# Patient Record
Sex: FEMALE | Race: OTHER | Employment: UNEMPLOYED | URBAN - METROPOLITAN AREA
[De-identification: names, ages, dates, MRNs, and addresses within clinical notes are randomized per-mention and may not be internally consistent; named-entity substitution may affect disease eponyms.]

---
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
## 2022-07-07 ENCOUNTER — HOSPITAL ENCOUNTER (EMERGENCY)
Age: 22
Discharge: HOME | End: 2022-07-07
Attending: EMERGENCY MEDICINE
Payer: COMMERCIAL

## 2022-07-07 VITALS
HEIGHT: 60 IN | OXYGEN SATURATION: 99 % | HEART RATE: 89 BPM | WEIGHT: 113.32 LBS | DIASTOLIC BLOOD PRESSURE: 80 MMHG | SYSTOLIC BLOOD PRESSURE: 124 MMHG | BODY MASS INDEX: 22.25 KG/M2 | TEMPERATURE: 98.4 F | RESPIRATION RATE: 16 BRPM

## 2022-07-07 DIAGNOSIS — R10.30 LOWER ABDOMINAL PAIN: ICD-10-CM

## 2022-07-07 DIAGNOSIS — K59.04 CHRONIC IDIOPATHIC CONSTIPATION: Primary | ICD-10-CM

## 2022-07-07 LAB
BILIRUBIN URINE: NEGATIVE
BLOOD, URINE: NEGATIVE
CLARITY: CLEAR
COLOR: YELLOW
GLUCOSE URINE: NEGATIVE MG/DL
HCG(URINE) PREGNANCY TEST: NEGATIVE
KETONES, URINE: ABNORMAL MG/DL
LEUKOCYTE ESTERASE, URINE: NEGATIVE
MICROSCOPIC EXAMINATION: ABNORMAL
NITRITE, URINE: NEGATIVE
PH UA: 6 (ref 5–8)
PROTEIN UA: NEGATIVE MG/DL
SPECIFIC GRAVITY UA: >=1.03 (ref 1–1.03)
URINE REFLEX TO CULTURE: ABNORMAL
URINE TYPE: ABNORMAL
UROBILINOGEN, URINE: 1 E.U./DL

## 2022-07-07 PROCEDURE — 99283 EMERGENCY DEPT VISIT LOW MDM: CPT

## 2022-07-07 PROCEDURE — 81003 URINALYSIS AUTO W/O SCOPE: CPT

## 2022-07-07 PROCEDURE — 84703 CHORIONIC GONADOTROPIN ASSAY: CPT

## 2022-07-07 RX ORDER — DOCUSATE SODIUM 100 MG/1
100 CAPSULE, LIQUID FILLED ORAL 2 TIMES DAILY
Qty: 60 CAPSULE | Refills: 0 | Status: SHIPPED | OUTPATIENT
Start: 2022-07-07 | End: 2022-08-02

## 2022-07-07 RX ORDER — SENNA AND DOCUSATE SODIUM 50; 8.6 MG/1; MG/1
1 TABLET, FILM COATED ORAL DAILY
Qty: 10 TABLET | Refills: 0 | Status: SHIPPED | OUTPATIENT
Start: 2022-07-07 | End: 2022-08-02

## 2022-07-07 ASSESSMENT — ENCOUNTER SYMPTOMS
SORE THROAT: 0
SHORTNESS OF BREATH: 0
EYES NEGATIVE: 1
NAUSEA: 0
RESPIRATORY NEGATIVE: 1
ABDOMINAL PAIN: 1
DIARRHEA: 0
CONSTIPATION: 1
ABDOMINAL DISTENTION: 0
ALLERGIC/IMMUNOLOGIC NEGATIVE: 1
VOMITING: 0

## 2022-07-07 ASSESSMENT — PAIN - FUNCTIONAL ASSESSMENT: PAIN_FUNCTIONAL_ASSESSMENT: NONE - DENIES PAIN

## 2022-07-07 NOTE — ED PROVIDER NOTES
[address] ENCOUNTER      Pt Name: Taina Che  MRN: [id number]  [place] 2000  Date ofevaluation: 7/7/2022  Provider: Baron La MD    CHIEF COMPLAINT       Chief Complaint   Patient presents with    Abdominal Pain     Belives she's constipated. Last BM 2 days ago and describes it as normal. pain is located allover abdomen when she tries to bare down and 30 minutes after she goes. She's been taking some OTC x 3 days medicine but hasn't helped anymore. Denies any n/v         HISTORY OF PRESENT ILLNESS   (Location/Symptom, Timing/Onset,Context/Setting, Quality, Duration, Modifying Factors, Severity)  Note limiting factors. Taina Che is a 23 y.o. female  who  has no past medical history on file. who presents to the emergency department    53-year-old female presents for abdominal pain and constipation. Has not had a bowel movement for greater than 2 days. Describes it as kind of a aching bloating feeling in her lower abdomen worse when she is trying to bear down and 30 minutes after she attempts to have a bowel movement. Has had 1 very small hard bowel movement. Has been taking over-the-counter MiraLAX for the last 3 days but has not really helped. Does frequently get constipated this is not out of the ordinary for her. No nausea or vomiting. No other associated symptoms. No other modifying factors besides those listed mild to moderate in nature. Gradual in onset constant and unchanging. No other known risk factors. The history is provided by the patient. No  was used. NursingNotes were reviewed. REVIEW OF SYSTEMS    (2-9 systems for level 4, 10 or more for level 5)     Review of Systems   Constitutional: Negative. Negative for chills and fever. HENT: Negative. Negative for congestion and sore throat. Eyes: Negative. Respiratory: Negative. Negative for shortness of breath. Cardiovascular: Negative. Negative for chest pain and palpitations. Gastrointestinal: Positive for abdominal pain and constipation. Negative for abdominal distention, diarrhea, nausea and vomiting. Genitourinary: Negative. Negative for decreased urine volume, dysuria, vaginal bleeding and vaginal discharge. Musculoskeletal: Negative. Skin: Negative. Allergic/Immunologic: Negative. Neurological: Negative. Negative for light-headedness and headaches. Hematological: Negative. Does not bruise/bleed easily. All other systems reviewed and are negative. Except as noted above the remainder of the review of systems was reviewed and negative. PAST MEDICAL HISTORY   No past medical history on file. SURGICALHISTORY       Past Surgical History:   Procedure Laterality Date    APPENDECTOMY           CURRENT MEDICATIONS       Previous Medications    HYDROCORTISONE 1 % CREAM    Apply topically 2 times daily while symptoms last    PRENATAL VIT-FE FUMARATE-FA (PRENATAL VITAMIN) 28-0.8 MG TABS TABLET    Prenatal 28 mg-800 mcg tablet   Take 1 tablet every day by oral route. Patient has no known allergies. FAMILY HISTORY     No family history on file.        SOCIAL HISTORY       Social History     Socioeconomic History    Marital status: Single     Spouse name: Not on file    Number of children: Not on file    Years of education: Not on file    Highest education level: Not on file   Occupational History    Not on file   Tobacco Use    Smoking status: Never Smoker    Smokeless tobacco: Never Used   Substance and Sexual Activity    Alcohol use: No    Drug use: No    Sexual activity: Never   Other Topics Concern    Not on file   Social History Narrative    Not on file     Social Determinants of Health     Financial Resource Strain:     Difficulty of Paying Living Expenses: Not on file   Food Insecurity:     Worried About Running Out of Food in the Last Year: Not on file  Ran Out of Food in the Last Year: Not on file   Transportation Needs:     Lack of Transportation (Medical): Not on file    Lack of Transportation (Non-Medical): Not on file   Physical Activity:     Days of Exercise per Week: Not on file    Minutes of Exercise per Session: Not on file   Stress:     Feeling of Stress : Not on file   Social Connections:     Frequency of Communication with Friends and Family: Not on file    Frequency of Social Gatherings with Friends and Family: Not on file    Attends [employer] Services: Not on file    Active Member of [address] [place] [address] numberFire or Organizations: Not on file    Attends Club or Organization Meetings: Not on file    Marital Status: Not on file   Intimate Partner Violence:     Fear of Current or Ex-Partner: Not on file    Emotionally Abused: Not on file    Physically Abused: Not on file    Sexually Abused: Not on file   Housing Stability:     Unable to Pay for Housing in the Last Year: Not on file    Number of [place] in the Last Year: Not on file    Unstable Housing in the Last Year: Not on file       SCREENINGS    [place] Coma Scale  Eye Opening: Spontaneous  Best Verbal Response: Oriented  Best Motor Response: Obeys commands  [place] Coma Scale Score: 15        PHYSICAL EXAM    (up to 7 for level 4, 8 or more for level 5)     ED Triage Vitals   BP Temp Temp src Pulse Resp SpO2 Height Weight   -- -- -- -- -- -- -- --       Physical Exam  Vitals and nursing note reviewed. Constitutional:       General: She is not in acute distress. Appearance: She is not toxic-appearing or diaphoretic. HENT:      Head: Normocephalic and atraumatic. Right Ear: External ear normal.      Left Ear: External ear normal.      Nose: Nose normal.      Mouth/Throat:      Mouth: Mucous membranes are moist.   Eyes:      Extraocular Movements: Extraocular movements intact. Pupils: Pupils are equal, round, and reactive to light.    Cardiovascular:      Rate and Rhythm: Normal rate and regular rhythm. Heart sounds: Normal heart sounds. Pulmonary:      Effort: Pulmonary effort is normal. No respiratory distress. Breath sounds: Normal breath sounds. No stridor. No wheezing, rhonchi or rales. Chest:      Chest wall: No tenderness. Abdominal:      General: Abdomen is flat. Bowel sounds are normal. There is no distension. Palpations: Abdomen is soft. There is no shifting dullness or mass. Tenderness: There is no abdominal tenderness. There is no right CVA tenderness, left CVA tenderness, guarding or rebound. Negative signs include Garza's sign and McBurney's sign. Hernia: No hernia is present. Musculoskeletal:      Cervical back: Neck supple. Right lower leg: No edema. Left lower leg: No edema. Skin:     General: Skin is warm and dry. Capillary Refill: Capillary refill takes less than 2 seconds. Neurological:      General: No focal deficit present. Mental Status: She is alert and oriented to person, place, and time. Psychiatric:         Mood and Affect: Mood normal.         Behavior: Behavior normal.         RESULTS         RADIOLOGY:   Non-plain filmimages such as CT, Ultrasound and MRI are read by the radiologist.   Interpretation per the Radiologist below, if available at the time ofthis note:    No orders to display         ED BEDSIDE ULTRASOUND:   Performed by ED Physician - none    LABS:  Labs Reviewed   URINALYSIS WITH REFLEX TO CULTURE - Abnormal; Notable for the following components:       Result Value    Ketones, Urine TRACE (*)     All other components within normal limits   PREGNANCY, URINE       All other labs were within normal range or not returned as of this dictation.     EMERGENCY DEPARTMENT COURSE and DIFFERENTIAL DIAGNOSIS/MDM:   Vitals:    Vitals:    07/07/22 1930   BP: 124/80   Pulse: 89   Resp: 16   Temp: 98.4 °F (36.9 °C)   TempSrc: Oral   SpO2: 99%   Weight: 113 lb 5.1 oz (51.4 kg)   Height: 5' (1.524 m) Patient was given thefollowing medications:  Medications - No data to display    ED [facility]    Pertinent Labs & Imaging studies reviewed. (See chart for details)   -  Patient seen and evaluated in the emergency department. -  Triage and nursing notes reviewed and incorporated. -  Old chart records reviewed and incorporated. -  Differential diagnosis includes: Differential diagnosis: Abdominal Aortic Aneurysm, Ischemic Bowel, Bowel Obstruction, Appendicitis, Diverticulitis, Pyelonephritis, UTI, STD, Ureterolithiasis, Colitis, Gonad Torsion, other      72-year-old female presents with constipation and mild lower abdominal pain. Abdomen is nontender not reproducible. Not peritoneal in nature. No urinary or vaginal symptoms. Patient is a history of prior appendectomy. Afebrile not tachycardic saturating well on room air otherwise shows no signs systemic illness. Urine does not appear infected. No signs of UTI pregnancy is negative. Will give patient symptomatic treatment with Docusate senna as well as Dulcolax and encourage patient to continue to take MiraLAX for stool softening and increase water intake. Strict return precautions given for any new or worsening symptoms.      -  Work-up included:  See above  -  ED treatment included: See above  -  Results discussed with patient. The patient is agreeable with plan of care and disposition. Exclusion criteria - the patient is NOT to be included for SEP-1 Core Measure due to:  2+ SIRS criteria are not met      REASSESSMENT       I estimate there is LOW risk for ACUTE APPENDICITIS, BOWEL OBSTRUCTION, CHOLECYSTITIS, DIVERTICULITIS, INCARCERATED HERNIA, PANCREATITIS, PERITONITIS, PELVIC INFLAMMATORY DISEASE, OVARIAN TORSION, PERFORATED BOWEL, BOWEL ISCHEMIA, CARDIAC ISCHEMIA, ECTOPIC PREGNANCY, TUBO-OVARIAN ABSCESS, or SEPSIS, thus I consider the discharge disposition reasonable.     The patient is at low risk for mortality based on demographic, history and clinical factors. Given the best available information and clinical assessment, I estimate the risk of hospitalization to be greater than risk of treatment at home. I have explained to the patient that the risk could rapidly change, given precautions for return and instructions. Explained to patient that the risk for mortality is low based on demographic, history and clinical factors. I discussed with patient the results of evaluation in the ED, diagnosis, care, and prognosis. The plan is to discharge to home. Patient is in agreement with plan and questions have been answered. I also discussed with patient the reasons which may require a return visit and the importance of follow-up care. The patient is well-appearing, nontoxic, and improved at the time of discharge. Patient agrees to call to arrange follow-up care as directed. Patient understands to return immediately for worsening/change in symptoms. CRITICAL CARE TIME   Total Critical Care time was 12 minutes, excluding separately reportable procedures. There was a high probability of clinically significant/life threatening deterioration in the patient's condition which required my urgent intervention. This includes multiple reevaluations, vital sign monitoring, pulse oximetry monitoring, telemetry monitoring, clinical response to the IV medications, reviewing the nursing notes, consultation time, dictation/documentation time, and interpretation of the labwork. (This time excludes time spent performing procedures). CONSULTS:  None    PROCEDURES:  Unless otherwise noted below, none     Procedures    FINAL IMPRESSION      1. Chronic idiopathic constipation    2.  Lower abdominal pain          DISPOSITION/PLAN   DISPOSITION        PATIENT REFERREDTO:  BELINDA BRAVO-Sean [facility] [address]  [address] [facility]  [phone]    Schedule an appointment as soon as possible for a visit         DISCHARGEMEDICATIONS:  New Prescriptions    DOCUSATE SODIUM (COLACE) 100 MG CAPSULE    Take 1 capsule by mouth 2 times daily    SENNOSIDES-DOCUSATE SODIUM (SENOKOT-S) 8.6-50 MG TABLET    Take 1 tablet by mouth daily          (Please note that portions of this note were completed with a voice recognition program.  Efforts were made to edit the dictations but occasionally words are mis-transcribed.)    Deborah Cota MD (electronically signed)  Attending Emergency Physician         Deborah Cota MD  07/07/22 2008

## 2022-07-07 NOTE — DISCHARGE INSTR - COC
Continuity of Care Form    Patient Name: Christiano Crowe   :  2000  MRN:  [id number]    Admit date:  (Not on file)  Discharge date:  ***    Code Status Order: No Order   Advance Directives:      Admitting Physician:  No admitting provider for patient encounter. PCP: [facility]    Discharging Nurse: [facility] Unit/Room#: No information available for this encounter. Discharging Unit Phone Number: ***    Emergency Contact:   Extended Emergency Contact Information  Primary Emergency Contact: TaYaritza  Address: [address] [place] [address] Phone: [phone]  Relation: Parent  Secondary Emergency Contact: KeyonPablito  Address: [address] Phone: [phone]  Relation: Parent    Past Surgical History:  Past Surgical History:   Procedure Laterality Date    APPENDECTOMY         Immunization History: There is no immunization history on file for this patient. Active Problems: There is no problem list on file for this patient. Isolation/Infection:   Isolation            No Isolation          Patient Infection Status       None to display            Nurse Assessment:  Last Vital Signs: There were no vitals taken for this visit. Last documented pain score (0-10 scale):    Last Weight:   Wt Readings from Last 1 Encounters:   21 109 lb 2 oz (49.5 kg) (15 %, Z= -1.03)*     * Growth percentiles are based on [facility] (Girls, 2-20 Years) data.      Mental Status:  {IP PT MENTAL STATUS:22794}    IV Access:  { TANJA IV ACCESS:958562287}    Nursing Mobility/ADLs:  Walking   {CHP DME JBVZ:662380110}  Transfer  {CHP DME VATU:442416493}  Bathing  {CHP DME MWVL:753906403}  Dressing  {CHP DME VLNO:307944772}  Toileting  {CHP DME LQZ}  Feeding  {CHP DME QZLY:593780640}  Med Admin  {CHP DME IAQR:780682103}  Med Delivery   { TANJA MED transferring to Rehab): {Prognosis:8163614103}    Recommended Labs or Other Treatments After Discharge: ***    Physician Certification: I certify the above information and transfer of Orvil Signs  is necessary for the continuing treatment of the diagnosis listed and that she requires {Admit to Appropriate Level of Care:76426} for {GREATER/LESS:227196235} 30 days.      Update Admission H&P: {CHP DME Changes in VRUOP:232534443}    PHYSICIAN SIGNATURE:  {Esignature:890925055}

## 2022-08-02 ENCOUNTER — HOSPITAL ENCOUNTER (EMERGENCY)
Age: 22
Discharge: HOME | End: 2022-08-02
Payer: COMMERCIAL

## 2022-08-02 VITALS
TEMPERATURE: 102.3 F | RESPIRATION RATE: 15 BRPM | HEART RATE: 115 BPM | WEIGHT: 110.89 LBS | SYSTOLIC BLOOD PRESSURE: 119 MMHG | OXYGEN SATURATION: 99 % | BODY MASS INDEX: 17.82 KG/M2 | DIASTOLIC BLOOD PRESSURE: 74 MMHG | HEIGHT: 66 IN

## 2022-08-02 DIAGNOSIS — H66.002 ACUTE SUPPURATIVE OTITIS MEDIA OF LEFT EAR WITHOUT SPONTANEOUS RUPTURE OF TYMPANIC MEMBRANE, RECURRENCE NOT SPECIFIED: ICD-10-CM

## 2022-08-02 DIAGNOSIS — U07.1 COVID-19 VIRUS INFECTION: Primary | ICD-10-CM

## 2022-08-02 LAB
S PYO AG THROAT QL: NEGATIVE
SARS-COV-2, NAAT: DETECTED

## 2022-08-02 PROCEDURE — 87081 CULTURE SCREEN ONLY: CPT

## 2022-08-02 PROCEDURE — 6370000000 HC RX 637 (ALT 250 FOR IP): Performed by: PHYSICIAN ASSISTANT

## 2022-08-02 PROCEDURE — 87635 SARS-COV-2 COVID-19 AMP PRB: CPT

## 2022-08-02 PROCEDURE — 99283 EMERGENCY DEPT VISIT LOW MDM: CPT

## 2022-08-02 PROCEDURE — 87880 STREP A ASSAY W/OPTIC: CPT

## 2022-08-02 PROCEDURE — 87077 CULTURE AEROBIC IDENTIFY: CPT

## 2022-08-02 RX ORDER — AMOXICILLIN 500 MG/1
500 CAPSULE ORAL 3 TIMES DAILY
Qty: 30 CAPSULE | Refills: 0 | Status: SHIPPED | OUTPATIENT
Start: 2022-08-02 | End: 2022-08-12

## 2022-08-02 RX ORDER — MEDROXYPROGESTERONE ACETATE 150 MG/ML
150 INJECTION, SUSPENSION INTRAMUSCULAR
COMMUNITY
Start: 2021-12-14 | End: 2022-12-09

## 2022-08-02 RX ORDER — ACETAMINOPHEN 325 MG/1
650 TABLET ORAL ONCE
Status: COMPLETED | OUTPATIENT
Start: 2022-08-02 | End: 2022-08-02

## 2022-08-02 RX ORDER — IBUPROFEN 600 MG/1
600 TABLET ORAL ONCE
Status: COMPLETED | OUTPATIENT
Start: 2022-08-02 | End: 2022-08-02

## 2022-08-02 RX ADMIN — IBUPROFEN 600 MG: 600 TABLET ORAL at 19:36

## 2022-08-02 RX ADMIN — ACETAMINOPHEN 650 MG: 325 TABLET ORAL at 19:36

## 2022-08-02 ASSESSMENT — PAIN DESCRIPTION - LOCATION: LOCATION: HEAD

## 2022-08-02 ASSESSMENT — PAIN SCALES - GENERAL
PAINLEVEL_OUTOF10: 5
PAINLEVEL_OUTOF10: 7

## 2022-08-02 ASSESSMENT — PAIN DESCRIPTION - DESCRIPTORS: DESCRIPTORS: ACHING

## 2022-08-02 NOTE — ED NOTES
Handoff report to Silvia Garces RN to assume care. Denies further questions.       Larry Carbajal RN  08/02/22 3287

## 2022-08-02 NOTE — DISCHARGE INSTRUCTIONS
-You need to quarantine for days 1-5 from symptom onset. You can discontinue quarantine after day 5 as long as you have been fever free for  the previous 24 hours without the use of fever reducing medications.   You then need to wear a mask when around others for days 6-10

## 2022-08-03 NOTE — ED PROVIDER NOTES
[facility] ENCOUNTER        Pt Name: Daniele Cook  MRN: [id number]  [place] 2000  Date of evaluation: 8/2/2022  Provider: LINDA Guadarrama      ADVANCED PRACTICE PROVIDER, I HAVE EVALUATED THIS PATIENT    CHIEF COMPLAINT     Sore throat      HISTORY OF PRESENT ILLNESS  (Location/Symptom, Timing/Onset, Context/Setting, Quality, Duration,Modifying Factors, Severity.)   Daniele Cook is a 23 y.o. female who presents to the emergencydepartment for sore throat that started yesterday. Today she has developed headache, chills, myalgias. Denies fever. Took Tylenol around 10 AM.  Nothing since. She denies cough, congestion, rhinorrhea. Does report nausea and 3 episodes of vomiting today. Has tolerated water though. Denies diarrhea. Denies abdominal pain. Denies sick contacts. No prior episodes. No health problems. Nursing Notes were reviewed and I agree. REVIEW OF SYSTEMS    (2-9 systems for level 4, 10 or more for level 5)   Review of Systems     Pertinent positives and negatives as per HPI. All other systems reviewed and are negative except as noted    PAST MEDICAL HISTORY   History reviewed. No pertinent past medical history. SURGICAL HISTORY         Procedure Laterality Date    APPENDECTOMY         CURRENT MEDICATIONS       Previous Medications    MEDROXYPROGESTERONE (DEPO-PROVERA) 150 MG/ML INJECTION    Inject 150 mg into the muscle       ALLERGIES     Patient has no known allergies. FAMILY HISTORY     History reviewed. No pertinent family history. No family status information on file. SOCIAL HISTORY      reports that she has never smoked. She has never used smokeless tobacco. She reports that she does not drink alcohol and does not use drugs.     PHYSICAL EXAM    (up to 7 for level 4, 8 or more for level 5)     ED Triage Vitals [08/02/22 1834]   BP Temp Temp Source Heart Rate Resp SpO2 Height Weight   119/74 98.8 °F (37.1 °C) Oral (!) 113 16 99 % 5' 6\" (1.676 m) 110 lb 14.3 oz (50.3 kg)       Physical Exam  Constitutional:       General: She is not in acute distress. Appearance: Normal appearance. She is well-developed. She is not ill-appearing, toxic-appearing or diaphoretic. HENT:      Head: Normocephalic and atraumatic. Right Ear: Tympanic membrane, ear canal and external ear normal.      Ears:      Comments: Left TM erythematous with purulent exudate. No perforation. Canal normal.     Mouth/Throat:      Mouth: Mucous membranes are moist.      Pharynx: Oropharynx is clear. No oropharyngeal exudate or posterior oropharyngeal erythema. Comments: No trismus. No difficulty swallowing or handling secretions. No signs of airway compromise  Cardiovascular:      Rate and Rhythm: Regular rhythm. Tachycardia present. Heart sounds: Normal heart sounds. Pulmonary:      Effort: Pulmonary effort is normal. No respiratory distress. Breath sounds: Normal breath sounds. Musculoskeletal:         General: Normal range of motion. Cervical back: Normal range of motion and neck supple. Skin:     General: Skin is warm. Neurological:      Mental Status: She is alert. Psychiatric:         Mood and Affect: Mood normal.         Behavior: Behavior normal.         Thought Content:  Thought content normal.         Judgment: Judgment normal.       DIFFERENTIAL DIAGNOSIS   Strep throat, COVID, URI, otitis media, other    DIAGNOSTICRESULTS         RADIOLOGY:   Non-plain film images such as CT, Ultrasound and MRI are read by the radiologist. Plain radiographic images are visualized and preliminarily interpreted by LINDA Tripathi with the below findings:      Interpretation per the Radiologist below, if available at the time of this note:    No orders to display         LABS:  Labs Reviewed   COVID-19, RAPID - Abnormal; Notable for the following components:       Result Value    SARS-CoV-2, NAAT REFERRED TO:  BELINDA Peoples [address]  [address] [facility]  [phone]    Schedule an appointment as soon as possible for a visit   for reevaluation    [place] [address]  [phone]    As needed, If symptoms worsen      DISCHARGE MEDICATIONS:  New Prescriptions    AMOXICILLIN (AMOXIL) 500 MG CAPSULE    Take 1 capsule by mouth in the morning and 1 capsule at noon and 1 capsule before bedtime. Do all this for 10 days.        (Please note that portions ofthis note were completed with a voice recognition program.  Efforts were made to edit the dictations but occasionally words are mis-transcribed.)    Katt Chowdhury, [address] [place] [address]  08/02/22 3409

## 2022-08-03 NOTE — ED NOTES
Discharge and education instructions reviewed. Patient verbalized understanding, teach-back successful. Patient denied questions at this time. No acute distress noted. Patient instructed to follow-up as noted - return to emergency department if symptoms worsen. Patient verbalized understanding. Discharged per EDMD with discharge instructions.           Isabel Gutierrez, [place]  08/02/22 0368

## 2022-08-05 LAB
ORGANISM: ABNORMAL
S PYO THROAT QL CULT: ABNORMAL
S PYO THROAT QL CULT: ABNORMAL

## 2022-12-22 ENCOUNTER — HOSPITAL ENCOUNTER (EMERGENCY)
Age: 22
Discharge: HOME | End: 2022-12-22
Attending: EMERGENCY MEDICINE
Payer: COMMERCIAL

## 2022-12-22 VITALS
BODY MASS INDEX: 20.19 KG/M2 | RESPIRATION RATE: 18 BRPM | OXYGEN SATURATION: 98 % | TEMPERATURE: 98.5 F | SYSTOLIC BLOOD PRESSURE: 101 MMHG | WEIGHT: 106.92 LBS | DIASTOLIC BLOOD PRESSURE: 63 MMHG | HEART RATE: 76 BPM | HEIGHT: 61 IN

## 2022-12-22 DIAGNOSIS — H92.01 RIGHT EAR PAIN: Primary | ICD-10-CM

## 2022-12-22 PROCEDURE — 99283 EMERGENCY DEPT VISIT LOW MDM: CPT

## 2022-12-22 RX ORDER — CETIRIZINE HYDROCHLORIDE 10 MG/1
10 TABLET ORAL DAILY
Qty: 30 TABLET | Refills: 0 | Status: SHIPPED | OUTPATIENT
Start: 2022-12-22

## 2022-12-22 ASSESSMENT — PAIN DESCRIPTION - LOCATION: LOCATION: EAR

## 2022-12-22 ASSESSMENT — PAIN - FUNCTIONAL ASSESSMENT: PAIN_FUNCTIONAL_ASSESSMENT: 0-10

## 2022-12-22 ASSESSMENT — PAIN DESCRIPTION - DESCRIPTORS: DESCRIPTORS: ACHING

## 2022-12-22 ASSESSMENT — PAIN DESCRIPTION - ORIENTATION: ORIENTATION: RIGHT

## 2022-12-22 ASSESSMENT — PAIN SCALES - GENERAL: PAINLEVEL_OUTOF10: 3

## 2022-12-22 NOTE — ED TRIAGE NOTES
Patient presents to ED complaining of right ear pain x2 days. Denies other symptoms, denies changes in hearing. Patient resting on bed, respirations even and easy at this time. No obvious distress.

## 2022-12-22 NOTE — ED NOTES
Patient ambulatory from ED. AVS provided and discussed with patient. All questions answered. Patient verbalizes understanding of discharge instructions. Respirations even and easy. No obvious distress at this time.        Jeet Isidro RN  12/22/22 7467

## 2022-12-22 NOTE — ED PROVIDER NOTES
[facility] ENCOUNTER      Pt Name: Brian Balbuena  MRN: [id number]  [place] 2000  Date of evaluation: 12/22/2022  Provider: Carly Morgan, [address] [place] [address]       Chief Complaint   Patient presents with    Otalgia     Patient presents to ED complaining of right ear pain x2 days. Denies other symptoms, denies changes in hearing. HISTORY OF PRESENT ILLNESS   (Location/Symptom, Timing/Onset, Context/Setting, Quality, Duration, Modifying Factors, Severity)  Note limiting factors. Brian Balbuena is a 23 y.o. female who presents to the emergency department with a complaint of right ear pain for the past 2 days. The patient states that she has had some mild discomfort in the right ear but not severe pain as well as an itching sensation. She denies any change in her hearing, hearing loss, tinnitus or vertigo. She denies any sinus drainage, fever, chills, cough or cold symptoms. She denies any sore throat. She denies any chest pain or shortness of breath. She denies any trauma or injury. She denies any problems with the left ear. She denies any dental pain. Denies any drainage from the ear. Nursing Notes were reviewed. HPI        REVIEW OF SYSTEMS    (2-9 systems for level 4, 10 or more for level 5)       Constitutional: Negative for fever or chills. HENT: Negative for rhinorrhea and sore throat. Eyes: Negative for redness or drainage. Respiratory: Negative for shortness of breath or dyspnea on exertion. Cardiovascular: Negative for chest pain. Gastrointestinal: Negative for abdominal pain. Negative for vomiting or diarrhea. Genitourinary: Negative for flank pain. Negative for dysuria. Negative for hematuria. Neurological: Negative for headache. Musculoskeletal:  Negative edema. Hematological: Negative for adenopathy.        All systems are reviewed and are negative except for those listed above in the history of present illness and ROS. PAST MEDICAL HISTORY   History reviewed. No pertinent past medical history. SURGICAL HISTORY       Past Surgical History:   Procedure Laterality Date    APPENDECTOMY           CURRENT MEDICATIONS       Previous Medications    MEDROXYPROGESTERONE (DEPO-PROVERA) 150 MG/ML INJECTION    Inject 150 mg into the muscle       ALLERGIES     Patient has no known allergies. FAMILY HISTORY     History reviewed. No pertinent family history. SOCIAL HISTORY       Social History     Socioeconomic History    Marital status: Single     Spouse name: None    Number of children: None    Years of education: None    Highest education level: None   Tobacco Use    Smoking status: Never    Smokeless tobacco: Never   Vaping Use    Vaping Use: Never used   Substance and Sexual Activity    Alcohol use: No    Drug use: No    Sexual activity: Never       SCREENINGS    Young Coma Scale  Eye Opening: Spontaneous  Best Verbal Response: Oriented  Best Motor Response: Obeys commands  [place]o Coma Scale Score: 15        PHYSICAL EXAM    (up to 7 for level 4, 8 or more for level 5)     ED Triage Vitals [12/22/22 1021]   BP Temp Temp Source Heart Rate Resp SpO2 Height Weight   116/67 98.5 °F (36.9 °C) Oral 84 20 100 % 5' 1\" (1.549 m) 106 lb 14.8 oz (48.5 kg)         Physical Exam   Constitutional: Awake and alert. Very pleasant. Well-appearing. Appears comfortable. Head: No visible evidence of trauma. Normocephalic. Eyes: Pupils equal and reactive. No photophobia. Conjunctiva normal.    HENT: Oral mucosa moist.  Airway patent. Pharynx without erythema. Nares were clear. Sinuses nontender to percussion and palpation. No mastoid tenderness. External ears were normal.  Canals were clear and patent and normal.  Tympanic membranes were intact bilaterally without erythema. There was air-fluid level noted in the right tympanic membrane. Pharynx without erythema. Uvula midline.   No stridor or drooling. Dentition was normal in appearance. Neck:  Soft and supple. Nontender. Heart:  Regular rate and rhythm. Lungs:  No conversational dyspnea or accessory muscle use. Musculoskeletal: Extremities non-tender with full range of motion. Neurological: Alert and oriented x 3. Speech clear. Cranial nerves II-XII intact. No facial droop. No acute focal motor or sensory deficits. Skin: Skin is warm and dry. No rash. Lymphatic:  No lympadenopathy. Psychiatric: Normal mood and affect. Behavior is normal.         DIAGNOSTIC RESULTS     EKG: All EKG's are interpreted by the Emergency Department Physician who either signs or Co-signs this chart in the absence of a cardiologist.        RADIOLOGY:   Non-plain film images such as CT, Ultrasound and MRI are read by the radiologist. Plain radiographic images are visualized and preliminarily interpreted by the emergency physician with the below findings:        Interpretation per the Radiologist below, if available at the time of this note:    No orders to display         ED BEDSIDE ULTRASOUND:   Performed by ED Physician - none    LABS:  Labs Reviewed - No data to display    All other labs were within normal range or not returned as of this dictation. EMERGENCY DEPARTMENT COURSE and DIFFERENTIAL DIAGNOSIS/MDM:   Vitals:    Vitals:    12/22/22 1021   BP: 116/67   Pulse: 84   Resp: 20   Temp: 98.5 °F (36.9 °C)   TempSrc: Oral   SpO2: 100%   Weight: 106 lb 14.8 oz (48.5 kg)   Height: 5' 1\" (1.549 m)         MDM        The patient presents with mild discomfort in the right ear and itching sensation. She does have an air-fluid level in the right tympanic membrane but there is no evidence of any erythema or induration. Tympanic membrane is normal in appearance otherwise. She will be given a prescription for Zyrtec. Currently no evidence of otitis media.   I advised her to drink plenty of fluids and follow-up with a primary care physician 1 to 2 days for reexamination. If her condition worsens or new symptoms develop, she was advised to return immediately to the emergency department. Is this patient to be included in the SEP-1 Core Measure due to severe sepsis or septic shock? No   Exclusion criteria - the patient is NOT to be included for SEP-1 Core Measure due to: Infection is not suspected      REASSESSMENT              CRITICAL CARE TIME   Total Critical Care time was 0 minutes, excluding separately reportable procedures. There was a high probability of clinically significant/life threatening deterioration in the patient's condition which required my urgent intervention. CONSULTS:  None    PROCEDURES:  Unless otherwise noted below, none     Procedures        FINAL IMPRESSION      1. Right ear pain          DISPOSITION/PLAN   DISPOSITION Decision To Discharge 12/22/2022 10:37:34 AM      PATIENT REFERRED TO:  BELINDA G[facility]me[facility]  [address] [place] [address]  [phone]    Call today      DISCHARGE MEDICATIONS:  New Prescriptions    CETIRIZINE (ZYRTEC) 10 MG TABLET    Take 1 tablet by mouth daily     Controlled Substances Monitoring:     No flowsheet data found. (Please note that portions of this note were completed with a voice recognition program.  Efforts were made to edit the dictations but occasionally words are mis-transcribed. )    1859 Paramjit Batista DO (electronically signed)  Attending Emergency Physician           Azul Chirinos DO  12/22/22 1047

## 2022-12-27 ENCOUNTER — HOSPITAL ENCOUNTER (EMERGENCY)
Age: 22
Discharge: HOME | End: 2022-12-27
Attending: EMERGENCY MEDICINE
Payer: COMMERCIAL

## 2022-12-27 VITALS
RESPIRATION RATE: 18 BRPM | DIASTOLIC BLOOD PRESSURE: 54 MMHG | HEIGHT: 57 IN | WEIGHT: 107.81 LBS | HEART RATE: 86 BPM | OXYGEN SATURATION: 100 % | TEMPERATURE: 98.7 F | BODY MASS INDEX: 23.26 KG/M2 | SYSTOLIC BLOOD PRESSURE: 103 MMHG

## 2022-12-27 DIAGNOSIS — H92.01 ACUTE OTALGIA, RIGHT: Primary | ICD-10-CM

## 2022-12-27 PROCEDURE — 6370000000 HC RX 637 (ALT 250 FOR IP): Performed by: EMERGENCY MEDICINE

## 2022-12-27 PROCEDURE — 99283 EMERGENCY DEPT VISIT LOW MDM: CPT

## 2022-12-27 RX ORDER — IBUPROFEN 600 MG/1
600 TABLET ORAL ONCE
Status: COMPLETED | OUTPATIENT
Start: 2022-12-27 | End: 2022-12-27

## 2022-12-27 RX ORDER — PSEUDOEPHEDRINE HCL 30 MG
30 TABLET ORAL EVERY 6 HOURS PRN
Qty: 16 TABLET | Refills: 0 | Status: SHIPPED | OUTPATIENT
Start: 2022-12-27 | End: 2023-01-03

## 2022-12-27 RX ADMIN — IBUPROFEN 600 MG: 600 TABLET, FILM COATED ORAL at 22:36

## 2022-12-27 ASSESSMENT — LIFESTYLE VARIABLES
HOW OFTEN DO YOU HAVE A DRINK CONTAINING ALCOHOL: NEVER
HOW MANY STANDARD DRINKS CONTAINING ALCOHOL DO YOU HAVE ON A TYPICAL DAY: PATIENT DOES NOT DRINK

## 2022-12-27 ASSESSMENT — PAIN - FUNCTIONAL ASSESSMENT
PAIN_FUNCTIONAL_ASSESSMENT: NONE - DENIES PAIN
PAIN_FUNCTIONAL_ASSESSMENT: 0-10

## 2022-12-27 ASSESSMENT — PAIN SCALES - GENERAL: PAINLEVEL_OUTOF10: 6

## 2022-12-28 NOTE — ED PROVIDER NOTES
CHIEF COMPLAINT  Otalgia (Right ear lobe pain, pt was seen here a couple days ago for the same, pt states that she thinks the earring back that got stuck in her ear 6 years ago is now causing the pain. )      [address] [place] [address] Road is a 23 y.o. female who  has no past medical history on file. presents to the ED complaining of right ear pain has been present over the past week. Patient states she was seen here in the emergency room a few days ago for the same complaint and was started on cetirizine. Patient states has been taking that and ibuprofen but still has some ear pain. States the pain feels like it is inside of her ear. States she was told that the last time she was in the emergency room she did not have an infection. Denies any fevers or chills. No nausea or vomiting. No cough or sputum production. No nasal congestion. Patient also states she think she has the back of an earring stuck in her earlobe has been present over the past 6 years. Denies any swelling of the right earlobe. No other complaints, modifying factors or associated symptoms. Pt was seen during the [place] 19 pandemic. Appropriate PPE worn by ME during patient encounters. Patient was cared for during a time with constrained hospital bed capacity with nationwide stress on resources and staffing. Nursing notes reviewed. History reviewed. No pertinent past medical history. Past Surgical History:   Procedure Laterality Date    APPENDECTOMY       History reviewed. No pertinent family history.   Social History     Socioeconomic History    Marital status: Single     Spouse name: Not on file    Number of children: Not on file    Years of education: Not on file    Highest education level: Not on file   Occupational History    Not on file   Tobacco Use    Smoking status: Never    Smokeless tobacco: Never   Vaping Use    Vaping Use: Never used   Substance and Sexual Activity    Alcohol use: No    Drug use: No    Sexual activity: Never   Other Topics Concern    Not on file   Social History Narrative    Not on file     Social Determinants of Health     Financial Resource Strain: Not on file   Food Insecurity: Not on file   Transportation Needs: Not on file   Physical Activity: Not on file   Stress: Not on file   Social Connections: Not on file   Intimate Partner Violence: Not on file   Housing Stability: Not on file     Current Facility-Administered Medications   Medication Dose Route Frequency Provider Last Rate Last Admin    ibuprofen (ADVIL;MOTRIN) tablet 600 mg  600 mg Oral Once Rosemary Croft MD         Current Outpatient Medications   Medication Sig Dispense Refill    pseudoephedrine (SUDAFED) 30 MG tablet Take 1 tablet by mouth every 6 hours as needed for Congestion (ear pressure) 16 tablet 0    cetirizine (ZYRTEC) 10 MG tablet Take 1 tablet by mouth daily 30 tablet 0    medroxyPROGESTERone (DEPO-PROVERA) 150 MG/ML injection Inject 150 mg into the muscle       No Known Allergies      REVIEW OF SYSTEMS  (up to 7 for level 4, 8 or more for level 5) pertinent positives per HPI otherwise noted to be negative    PHYSICAL EXAM  BP (!) 103/54   Pulse 86   Temp 98.7 °F (37.1 °C) (Oral)   Resp 18   Ht 4' 9\" (1.448 m)   Wt 107 lb 12.9 oz (48.9 kg)   SpO2 100%   BMI 23.33 kg/m²      CONSTITUTIONAL: AOx4, cooperative with exam, afebrile   HEAD: normocephalic, atraumatic   EYES: PERRL, EOMI, anicteric sclera   ENT: Moist mucous membranes, uvula midline, right TM with air-fluid level, no erythema of the TMs bilaterally, left TM normal, ear canals normal bilaterally, small less than half a centimeter ball-like structure palpated in the skin of the right earlobe just next to where it attaches to the cheek, no erythema or warmth of the right earlobe   NECK: Supple, symmetric, trachea midline, no cervical lymphadenopathy bilaterally   LUNGS: Bilateral breath sounds, CTAB, no rales/ronchi/wheezes   CARDIOVASCULAR: RRR, normal S1/S2, no m/r/g, 2+ pulses throughout   ABDOMEN: Soft, non-tender, non-distended, +BS   NEUROLOGIC:  MAEx4, GCS 15   MUSCULOSKELETAL: No clubbing, cyanosis or edema   SKIN: No rash, pallor or wounds on exposed surfaces         RADIOLOGY  X-RAYS:  I have reviewed radiologic plain film image(s). ALL OTHER NON-PLAIN FILM IMAGES SUCH AS CT, ULTRASOUND AND MRI HAVE BEEN READ BY THE RADIOLOGIST. No orders to display          EKG INTERPRETATION  None    PROCEDURES      ED COURSE/MDM  Eustachian tube dysfunction, otitis media, otitis externa, allergies, other  Patient seen and evaluated. History and physical as above. Nontoxic, afebrile. Patient presents with right ear pain. Patient does have air-fluid level in the right TM with no erythema or bulging of the TM. Was started on sertraline but still having pain. Also taking ibuprofen intermittently. Last dose of ibuprofen was this morning. Will treat will provide a dose of ibuprofen here in the ED. Discussed adding patient on short course of Sudafed to help relieve some of the pressure in the right ear. Patient agreeable. Patient also does have a small ball-like structure on the medial aspect of the right earlobe without any evidence of infection. Possibly related to an earring back which patient states has been there for the past 6 years. With patient having continued ear pain will provide information for follow-up with ENT. Patient agreeable care plan. Return instruct provided. Questions answered prior to discharge. Is this patient to be included in the SEP-1 Core Measure due to severe sepsis or septic shock?    No   Exclusion criteria - the patient is NOT to be included for SEP-1 Core Measure due to:  2+ SIRS criteria are not met      I estimate there is LOW risk for a ANAPHYLAXIS, DEEP SPACE INFECTION (e.g., THOMASS ANGINA OR RETROPHARYNGEAL ABSCESS), EPIGLOTTITIS, MENINGITIS, or AIRWAY COMPROMISE, thus I consider the discharge disposition reasonable. Also, there is no evidence or peritonitis, sepsis, or toxicity. Henry and I have discussed the diagnosis and risks, and we agree with discharging home to follow-up with their primary doctor. We also discussed returning to the Emergency Department immediately if new or worsening symptoms occur. We have discussed the symptoms which are most concerning (e.g., changing or worsening pain, trouble swallowing or breathing, neck stiffness or fever) that necessitate immediate return      Patient was given scripts for the following medications. I counseled patient how to take these medications. New Prescriptions    PSEUDOEPHEDRINE (SUDAFED) 30 MG TABLET    Take 1 tablet by mouth every 6 hours as needed for Congestion (ear pressure)           CLINICAL IMPRESSION  1. Acute otalgia, right        Blood pressure (!) 103/54, pulse 86, temperature 98.7 °F (37.1 °C), temperature source Oral, resp. rate 18, height 4' 9\" (1.448 m), weight 107 lb 12.9 oz (48.9 kg), SpO2 100 %, unknown if currently breastfeeding. DISPOSITION  Patient was discharged to home in good condition. [address]  [address] [facility]  [phone]    Call in 1 day  For a follow up appointment. Lisandra Sender, [address] [facility] ([facility])  [address] [address]  [phone]    Call in 1 day  For a follow up appointment. Disclaimer: All medical record entries made by [address] [place] [address] fletcher.       (Please note that this note was completed with a voice recognition program. Every attempt was made to edit the dictations, but inevitably there remain words that are mis-transcribed.)            Raven Head MD  12/27/22 7710

## 2022-12-28 NOTE — DISCHARGE INSTRUCTIONS
Call today or tomorrow [facility]  in [facility]-5 days. Use ibuprofen or Tylenol (unless prescribed medications that have Tylenol in it) for pain. You can take over the counter Ibuprofen (advil) tablets (4 every 8 hours or 3 every 6 hours or 2 every 4 hours). Take your medications as indicated. Return to the Emergency Department for worsening of ear pain, fever > 101.5 and not controlled with Tylenol or ibuprofen, feeling of the room spinning, repeated bouts of dizziness, inability to hear, any other care or concern.

## 2022-12-28 NOTE — ED NOTES
Pt presents to the ED with complaints of right ear pain that started a couple days ago. Pt was seen here just a couple days ago for the same. Pt states she now thinks the pain is from a earring back that she got stuck in her ear 6 years ago.       Ana Jarvis RN  12/27/22 9696

## 2023-01-04 ENCOUNTER — OFFICE VISIT (OUTPATIENT)
Dept: ENT CLINIC | Age: 23
End: 2023-01-04

## 2023-01-04 VITALS
DIASTOLIC BLOOD PRESSURE: 70 MMHG | SYSTOLIC BLOOD PRESSURE: 109 MMHG | HEIGHT: 57 IN | RESPIRATION RATE: 16 BRPM | BODY MASS INDEX: 22.87 KG/M2 | HEART RATE: 78 BPM | OXYGEN SATURATION: 99 % | WEIGHT: 106 LBS

## 2023-01-04 DIAGNOSIS — S01.342A: ICD-10-CM

## 2023-01-04 DIAGNOSIS — H93.8X1 EAR SWELLING, RIGHT: ICD-10-CM

## 2023-01-04 DIAGNOSIS — H92.01 RIGHT EAR PAIN: Primary | ICD-10-CM

## 2023-01-04 RX ORDER — CEPHALEXIN 500 MG/1
500 CAPSULE ORAL 2 TIMES DAILY
Qty: 14 CAPSULE | Refills: 0 | Status: SHIPPED | OUTPATIENT
Start: 2023-01-04 | End: 2023-01-09

## 2023-01-04 RX ORDER — IBUPROFEN 600 MG/1
600 TABLET ORAL 4 TIMES DAILY PRN
Qty: 50 TABLET | Refills: 1 | Status: SHIPPED | OUTPATIENT
Start: 2023-01-04

## 2023-01-04 NOTE — PROGRESS NOTES
[address] (:  2000) is a 23 y.o. female, here for evaluation of the following chief complaint(s):  Otalgia (Right )      ASSESSMENT/PLAN:  1. Right ear pain  2. Ear swelling, right  3. Puncture wound of left ear with foreign body, initial encounter    This is a very pleasant 23 y.o. female here today for evaluation of the the above-noted complaints.      -Right lobule ear foreign body excised  -Start antibiotic ointment to the incision site  -Discussed that sutures will be absorbed on their own and do not need removal.  Incision care discussed with the patient. Instructed to monitor for signs and symptoms of infection and wound dehiscence. -Start Keflex    Medical Decision Making: The following items were considered in medical decision making:  Independent review of images  Review / order clinical lab tests  Review / order radiology tests  Decision to obtain old records  Review and summation of old records as accessed through web care [facility] if applicable    SUBJECTIVE/OBJECTIVE:  CORIE Cook is here today for evaluation of ear issues. The patient has been seen in the emergency department multiple times for this. Visit notes from those visits were reviewed. She at first was prescribed Allegra for her symptoms but these did not improve it. She went back a second time and was told that she has a possible foreign body in her right lobule. There was some concern that this could have been an infection. Patient states that she used to have her ears pierced. She thinks that there may be an earring back retained in her right lobule that was never removed. She reportedly had an x-ray for this which was negative. Patient has had episodes related to pharyngitis for which she has been seen in the emergency department for in the past.  No recent episodes of tonsil infections.   She was COVID-positive over the summer. REVIEW OF SYSTEMS  The following systems were reviewed and revealed the following in addition to any already discussed in the HPI:    PHYSICAL EXAM    GENERAL: No acute distress, alert and oriented, no hoarseness, strong voice  EYES: EOMI, Anti-icteric  HENT:   Head: Normocephalic and atraumatic. Face:  Symmetric, facial nerve intact  Right Ear: There is a retained foreign body in the right lobule. There is no evidence of acute infection. Left Ear: Normal external ear, normal external auditory canal, intact tympanic membrane with normal mobility and aerated middle ear  Mouth/Oral Cavity:  normal lips, Uvula is midline, no mucosal lesions, no trismus, normal dentition, normal salivary quality/flow  Oropharynx/Larynx:  normal oropharynx, normal-appearing tonsils  Nose:Normal external nasal appearance. Normal mucosa   NECK: Normal range of motion, no thyromegaly, trachea is midline, no lymphadenopathy, no neck masses, no crepitus  CHEST: Normal respiratory effort, no retractions, breathing comfortably  SKIN: No rashes, normal appearing skin, no evidence of skin lesions/tumors  Neuro:  cranial nerve II-XII intact; normal gait  Cardio:  no edema        PROCEDURE  Excision of right lobule lesion with simple closure    After written consent was obtained, the posterior aspect of the right lobule was infiltrated with 1% lidocaine with 100,000 epinephrine. After allowing adequate time for anesthesia to take place, the area was prepped and draped in standard sterile fashion. I was able to palpate the foreign body contained within the right lobule of the ear. A small 5 mm incision was made directly over this. Gentle blunt dissection was then performed circumferentially to free up the lesion. A pair of grasping forceps was utilized to remove it. Cauterization with silver nitrate was performed to obtain hemostasis. The wound was then closed with several interrupted sutures.     This note was generated completely or in part utilizing Dragon dictation speech recognition software. Occasionally, words are mistranscribed and despite editing, the text may contain inaccuracies due to incorrect word recognition. If further clarification is needed please contact the office at [phone]. An electronic signature was used to authenticate this note.     --uBcky Frederick MD

## 2023-01-07 ENCOUNTER — HOSPITAL ENCOUNTER (EMERGENCY)
Age: 23
Discharge: HOME | End: 2023-01-07
Attending: EMERGENCY MEDICINE
Payer: COMMERCIAL

## 2023-01-07 VITALS
HEART RATE: 77 BPM | RESPIRATION RATE: 16 BRPM | BODY MASS INDEX: 22.97 KG/M2 | DIASTOLIC BLOOD PRESSURE: 72 MMHG | TEMPERATURE: 97.9 F | HEIGHT: 57 IN | WEIGHT: 106.48 LBS | SYSTOLIC BLOOD PRESSURE: 118 MMHG | OXYGEN SATURATION: 99 %

## 2023-01-07 DIAGNOSIS — Z51.89 VISIT FOR WOUND CHECK: Primary | ICD-10-CM

## 2023-01-07 PROCEDURE — 6370000000 HC RX 637 (ALT 250 FOR IP): Performed by: EMERGENCY MEDICINE

## 2023-01-07 PROCEDURE — 99283 EMERGENCY DEPT VISIT LOW MDM: CPT

## 2023-01-07 RX ORDER — SULFAMETHOXAZOLE AND TRIMETHOPRIM 800; 160 MG/1; MG/1
1 TABLET ORAL ONCE
Status: COMPLETED | OUTPATIENT
Start: 2023-01-07 | End: 2023-01-07

## 2023-01-07 RX ORDER — CEPHALEXIN 500 MG/1
500 CAPSULE ORAL 4 TIMES DAILY
Qty: 28 CAPSULE | Refills: 0 | Status: SHIPPED | OUTPATIENT
Start: 2023-01-07 | End: 2023-01-14

## 2023-01-07 RX ORDER — BACITRACIN, NEOMYCIN, POLYMYXIN B 400; 3.5; 5 [USP'U]/G; MG/G; [USP'U]/G
OINTMENT TOPICAL
Qty: 50 G | Refills: 0 | Status: SHIPPED | OUTPATIENT
Start: 2023-01-07 | End: 2023-01-17

## 2023-01-07 RX ORDER — SULFAMETHOXAZOLE AND TRIMETHOPRIM 800; 160 MG/1; MG/1
1 TABLET ORAL 2 TIMES DAILY
Qty: 1 TABLET | Refills: 0 | Status: SHIPPED | OUTPATIENT
Start: 2023-01-07 | End: 2023-01-17

## 2023-01-07 RX ORDER — ACETAMINOPHEN 325 MG/1
650 TABLET ORAL ONCE
Status: COMPLETED | OUTPATIENT
Start: 2023-01-07 | End: 2023-01-07

## 2023-01-07 RX ORDER — ACETAMINOPHEN 500 MG
500 TABLET ORAL 4 TIMES DAILY PRN
Qty: 120 TABLET | Refills: 0 | Status: SHIPPED | OUTPATIENT
Start: 2023-01-07

## 2023-01-07 RX ADMIN — SULFAMETHOXAZOLE AND TRIMETHOPRIM 1 TABLET: 800; 160 TABLET ORAL at 23:04

## 2023-01-07 RX ADMIN — ACETAMINOPHEN 650 MG: 325 TABLET ORAL at 23:04

## 2023-01-07 ASSESSMENT — PAIN - FUNCTIONAL ASSESSMENT: PAIN_FUNCTIONAL_ASSESSMENT: ACTIVITIES ARE NOT PREVENTED

## 2023-01-07 ASSESSMENT — PAIN DESCRIPTION - PAIN TYPE: TYPE: SURGICAL PAIN

## 2023-01-07 ASSESSMENT — PAIN DESCRIPTION - ONSET: ONSET: ON-GOING

## 2023-01-07 ASSESSMENT — PAIN SCALES - GENERAL: PAINLEVEL_OUTOF10: 6

## 2023-01-07 ASSESSMENT — PAIN DESCRIPTION - DESCRIPTORS: DESCRIPTORS: SORE;TENDER

## 2023-01-07 ASSESSMENT — PAIN DESCRIPTION - FREQUENCY: FREQUENCY: CONTINUOUS

## 2023-01-07 ASSESSMENT — PAIN DESCRIPTION - LOCATION: LOCATION: EAR

## 2023-01-07 ASSESSMENT — PAIN DESCRIPTION - ORIENTATION: ORIENTATION: RIGHT

## 2023-01-08 NOTE — ED NOTES
Patient presents to ED with c/o of swelling and pain of right ear lobe. Patient recently seen for foreign body removal from right ear lobe on 1/4/2023. Patient states she has been taking her antibiotics as prescribed. Sutures are intact, swelling present, no drainage or redness at site. Site is painful to touch, patient states ibuprofen \"takes the pain away until it's time for the next dose\". Currently rates pain 6/10.    Electronically signed by Stephane Florentino RN on 1/7/2023 at 10:45 PM       Stephane Florentino RN  01/07/23 5185

## 2023-01-08 NOTE — ED NOTES
Discharge education provided, patient utilized teach back and verbalized understanding.   Patient discharged in stable condition with copy of AVS.  Electronically signed by Lucho Renee RN on 1/7/2023 at 11:19 PM       Lucho Renee RN  01/07/23 2869

## 2023-01-09 NOTE — PROGRESS NOTES
Henry (:  2000) is a 23 y.o. female,New patient, here for evaluation of the following chief complaint(s):  New Patient (Needs right ear looked at recently had stiches in it )         ASSESSMENT/PLAN:  1. Ear swelling, right-mainly present behind her ear, likely postauricular lymph node swelling. She was seen at the ENT on , right lower ear foreign body was excised and patient was started on antibiotic ointment along with oral Keflex. She also had absorbable sutures placed. She followed up to the ER on  for wound check and was prescribed with TMP-SMX along with her Keflex. She has been doing wound care with antibiotic ointment and taking her oral antibiotics. Today on examination, still presence of swelling behind her ear but without signs of inflammation. No discharge. Advised her to continue with care. Wound care counseling provided. 2. Flu vaccine need  -     Influenza, FLUCELVAX, (age 10 mo+), IM, Preservative Free, 0.5 mL    Return in about 1 year (around 1/10/2024), or if symptoms worsen or fail to improve. Subjective   SUBJECTIVE/OBJECTIVE:  HPI    Review of Systems   Constitutional:  Negative for chills, fatigue and fever. HENT:  Positive for ear pain (posteriorly). Negative for congestion, rhinorrhea and sore throat. Eyes:  Negative for discharge, redness and visual disturbance. Respiratory:  Negative for cough and chest tightness. Cardiovascular:  Negative for chest pain, palpitations and leg swelling. Gastrointestinal:  Negative for abdominal pain, nausea and vomiting. Endocrine: Negative. Genitourinary:  Negative for dysuria. Musculoskeletal:  Negative for back pain and gait problem. Skin: Negative. Neurological:  Negative for syncope, facial asymmetry, speech difficulty, light-headedness and headaches. Objective   Physical Exam  Vitals reviewed. Constitutional:       Appearance: Normal appearance.    HENT:      Head: Normocephalic. Eyes:      Extraocular Movements: Extraocular movements intact. Pupils: Pupils are equal, round, and reactive to light. Cardiovascular:      Rate and Rhythm: Normal rate. Heart sounds: Normal heart sounds. No murmur heard. Pulmonary:      Effort: Pulmonary effort is normal.      Breath sounds: Normal breath sounds. Abdominal:      General: Bowel sounds are normal.      Palpations: Abdomen is soft. Musculoskeletal:         General: Normal range of motion. Skin:     General: Skin is warm. Neurological:      General: No focal deficit present. Mental Status: She is alert and oriented to person, place, and time. Mental status is at baseline. Psychiatric:         Mood and Affect: Mood normal.              Please note that this chart was generated using dragon dictation software. Although every effort was made to ensure the accuracy of this automated transcription, some errors in transcription may have occurred. An electronic signature was used to authenticate this note.     --Dimple English MD

## 2023-01-10 ENCOUNTER — OFFICE VISIT (OUTPATIENT)
Dept: INTERNAL MEDICINE CLINIC | Age: 23
End: 2023-01-10
Payer: COMMERCIAL

## 2023-01-10 VITALS
HEART RATE: 88 BPM | BODY MASS INDEX: 23.28 KG/M2 | OXYGEN SATURATION: 99 % | WEIGHT: 107.6 LBS | DIASTOLIC BLOOD PRESSURE: 68 MMHG | SYSTOLIC BLOOD PRESSURE: 101 MMHG | TEMPERATURE: 98.4 F

## 2023-01-10 DIAGNOSIS — Z23 FLU VACCINE NEED: ICD-10-CM

## 2023-01-10 DIAGNOSIS — H93.8X1 EAR SWELLING, RIGHT: Primary | ICD-10-CM

## 2023-01-10 PROCEDURE — 90471 IMMUNIZATION ADMIN: CPT | Performed by: STUDENT IN AN ORGANIZED HEALTH CARE EDUCATION/TRAINING PROGRAM

## 2023-01-10 PROCEDURE — 1036F TOBACCO NON-USER: CPT | Performed by: STUDENT IN AN ORGANIZED HEALTH CARE EDUCATION/TRAINING PROGRAM

## 2023-01-10 PROCEDURE — G8482 FLU IMMUNIZE ORDER/ADMIN: HCPCS | Performed by: STUDENT IN AN ORGANIZED HEALTH CARE EDUCATION/TRAINING PROGRAM

## 2023-01-10 PROCEDURE — G8427 DOCREV CUR MEDS BY ELIG CLIN: HCPCS | Performed by: STUDENT IN AN ORGANIZED HEALTH CARE EDUCATION/TRAINING PROGRAM

## 2023-01-10 PROCEDURE — 99204 OFFICE O/P NEW MOD 45 MIN: CPT | Performed by: STUDENT IN AN ORGANIZED HEALTH CARE EDUCATION/TRAINING PROGRAM

## 2023-01-10 PROCEDURE — G8420 CALC BMI NORM PARAMETERS: HCPCS | Performed by: STUDENT IN AN ORGANIZED HEALTH CARE EDUCATION/TRAINING PROGRAM

## 2023-01-10 PROCEDURE — 90674 CCIIV4 VAC NO PRSV 0.5 ML IM: CPT | Performed by: STUDENT IN AN ORGANIZED HEALTH CARE EDUCATION/TRAINING PROGRAM

## 2023-01-10 SDOH — ECONOMIC STABILITY: FOOD INSECURITY: WITHIN THE PAST 12 MONTHS, THE FOOD YOU BOUGHT JUST DIDN'T LAST AND YOU DIDN'T HAVE MONEY TO GET MORE.: NEVER TRUE

## 2023-01-10 SDOH — ECONOMIC STABILITY: FOOD INSECURITY: WITHIN THE PAST 12 MONTHS, YOU WORRIED THAT YOUR FOOD WOULD RUN OUT BEFORE YOU GOT MONEY TO BUY MORE.: NEVER TRUE

## 2023-01-10 ASSESSMENT — PATIENT HEALTH QUESTIONNAIRE - PHQ9
1. LITTLE INTEREST OR PLEASURE IN DOING THINGS: 0
SUM OF ALL RESPONSES TO PHQ9 QUESTIONS 1 & 2: 0
SUM OF ALL RESPONSES TO PHQ QUESTIONS 1-9: 0
2. FEELING DOWN, DEPRESSED OR HOPELESS: 0

## 2023-01-10 ASSESSMENT — ENCOUNTER SYMPTOMS
RHINORRHEA: 0
VOMITING: 0
BACK PAIN: 0
EYE DISCHARGE: 0
EYE REDNESS: 0
CHEST TIGHTNESS: 0
ABDOMINAL PAIN: 0
COUGH: 0
NAUSEA: 0
SORE THROAT: 0

## 2023-01-10 ASSESSMENT — SOCIAL DETERMINANTS OF HEALTH (SDOH): HOW HARD IS IT FOR YOU TO PAY FOR THE VERY BASICS LIKE FOOD, HOUSING, MEDICAL CARE, AND HEATING?: NOT HARD AT ALL

## 2023-01-11 ENCOUNTER — HOSPITAL ENCOUNTER (EMERGENCY)
Age: 23
Discharge: HOME | End: 2023-01-11
Payer: COMMERCIAL

## 2023-01-11 VITALS
OXYGEN SATURATION: 99 % | TEMPERATURE: 98.3 F | HEART RATE: 89 BPM | DIASTOLIC BLOOD PRESSURE: 77 MMHG | RESPIRATION RATE: 18 BRPM | SYSTOLIC BLOOD PRESSURE: 118 MMHG

## 2023-01-11 DIAGNOSIS — Z51.89 VISIT FOR WOUND CHECK: Primary | ICD-10-CM

## 2023-01-11 PROCEDURE — 99282 EMERGENCY DEPT VISIT SF MDM: CPT

## 2023-01-11 ASSESSMENT — ENCOUNTER SYMPTOMS: ROS SKIN COMMENTS: AS STATED IN HPI

## 2023-01-11 ASSESSMENT — LIFESTYLE VARIABLES
HOW MANY STANDARD DRINKS CONTAINING ALCOHOL DO YOU HAVE ON A TYPICAL DAY: PATIENT DOES NOT DRINK
HOW OFTEN DO YOU HAVE A DRINK CONTAINING ALCOHOL: NEVER

## 2023-01-11 ASSESSMENT — PAIN - FUNCTIONAL ASSESSMENT
PAIN_FUNCTIONAL_ASSESSMENT: NONE - DENIES PAIN
PAIN_FUNCTIONAL_ASSESSMENT: NONE - DENIES PAIN

## 2023-01-11 NOTE — ED PROVIDER NOTES
[address] ENCOUNTER        Pt Name: Devin Rosas  MRN: [id number]  [place] 2000  Date of evaluation: 1/7/2023  Provider: Sal Duran MD  [facility]: [facility]  Note Started: 10:19 PM EST 1/10/23    CHIEF COMPLAINT       Chief Complaint   Patient presents with    Wound Check     Earring backing removed from R ear at this ED 3 days ago. Pain to site       HISTORY OF PRESENT ILLNESS: 1 or more Elements   History From: Patient        Devin Rosas is a 23 y.o. female who presents for evaluation of wound check. Patient reports having foreign body removed from the posterior aspect of her right ear by ENT. She states that she was started on Keflex and observable sutures were placed. She reports discomfort and swelling to the affected area. Not sure there is any drainage. Denies fevers or inner ear pain. Denies sore throat. She states she is not scheduled to follow-up with ENT. Nursing Notes were all reviewed and agreed with or any disagreements were addressed in the HPI. REVIEW OF SYSTEMS :      Review of Systems    Positives and Pertinent negatives as per HPI. SURGICAL HISTORY     Past Surgical History:   Procedure Laterality Date    APPENDECTOMY         CURRENTMEDICATIONS       Discharge Medication List as of 1/7/2023 10:58 PM        CONTINUE these medications which have NOT CHANGED    Details   ibuprofen (ADVIL;MOTRIN) 600 MG tablet Take 1 tablet by mouth 4 times daily as needed for Pain, Disp-50 tablet, R-1Print      !! cephALEXin (KEFLEX) 500 MG capsule Take 1 capsule by mouth 2 times daily for 5 days, Disp-14 capsule, R-0Normal      cetirizine (ZYRTEC) 10 MG tablet Take 1 tablet by mouth daily, Disp-30 tablet, R-0Normal      medroxyPROGESTERone (DEPO-PROVERA) 150 MG/ML injection Inject 150 mg into the muscleHistorical Med       !! - Potential duplicate medications found. Please discuss with provider.           ALLERGIES Patient has no known allergies. FAMILYHISTORY     No family history on file. SOCIAL HISTORY       Social History     Tobacco Use    Smoking status: Never    Smokeless tobacco: Never   Vaping Use    Vaping Use: Never used   Substance Use Topics    Alcohol use: No    Drug use: No       SCREENINGS        [place] Coma Scale  Eye Opening: Spontaneous  Best Verbal Response: Oriented  Best Motor Response: Obeys commands  [place] Coma Scale Score: 15                CIWA Assessment  BP: 118/72  Heart Rate: 77           PHYSICAL EXAM  1 or more Elements     ED Triage Vitals [01/07/23 2230]   BP Temp Temp Source Heart Rate Resp SpO2 Height Weight   118/72 97.9 °F (36.6 °C) Oral 77 16 99 % 4' 9\" (1.448 m) 106 lb 7.7 oz (48.3 kg)       Physical Exam  Vitals reviewed. Constitutional:       Appearance: She is well-developed. HENT:      Head: Normocephalic and atraumatic. Mouth/Throat:      Mouth: Mucous membranes are moist.   Eyes:      Extraocular Movements: Extraocular movements intact. Conjunctiva/sclera: Conjunctivae normal.      Pupils: Pupils are equal, round, and reactive to light. Neck:      Trachea: No tracheal deviation. Cardiovascular:      Rate and Rhythm: Normal rate and regular rhythm. Heart sounds: Normal heart sounds. Pulmonary:      Effort: Pulmonary effort is normal.      Breath sounds: Normal breath sounds. Abdominal:      General: There is no distension. Palpations: Abdomen is soft. Tenderness: There is no abdominal tenderness. Musculoskeletal:         General: Swelling and tenderness present. Normal range of motion. Cervical back: Normal range of motion. Skin:     General: Skin is warm and dry. Findings: Erythema present. Neurological:      Mental Status: She is alert. DIAGNOSTIC RESULTS   LABS:    Labs Reviewed - No data to display    When ordered only abnormal lab results are displayed.  All other labs were within normal range or not returned as of this dictation. EKG:     RADIOLOGY:   Non-plain film images such as CT, Ultrasound and MRI are read by the radiologist. Plain radiographic images are visualized and preliminarily interpreted by the ED Provider with the below findings:      Interpretation per the Radiologist below, if available at the time of this note:    Discussed with Radiologist:     No orders to display     No results found. No results found. PROCEDURES   Unless otherwise noted below, none     Procedures    CRITICAL CARE TIME (.cct)       PAST MEDICAL HISTORY      has no past medical history on file. EMERGENCY DEPARTMENT COURSE and DIFFERENTIAL DIAGNOSIS/MDM:   Vitals:    Vitals:    01/07/23 2230   BP: 118/72   Pulse: 77   Resp: 16   Temp: 97.9 °F (36.6 °C)   TempSrc: Oral   SpO2: 99%   Weight: 106 lb 7.7 oz (48.3 kg)   Height: 4' 9\" (1.448 m)       Patient was given the following medications:  Medications   acetaminophen (TYLENOL) tablet 650 mg (650 mg Oral Given 1/7/23 2304)   sulfamethoxazole-trimethoprim (BACTRIM DS;SEPTRA DS) 800-160 MG per tablet 1 tablet (1 tablet Oral Given 1/7/23 2304)             Is this patient to be included in the SEP-1 Core Measure due to severe sepsis or septic shock? No   Exclusion criteria - the patient is NOT to be included for SEP-1 Core Measure due to: Infection is not suspected    CC/HPI Summary, DDx, ED Course, and Reassessment:   Patient resents ED for evaluation of wound check. Has a small incision where the foreign body was removed that was then sutured. She is taking Keflex 500 twice daily. There is some surrounding erythema and tenderness to the area. We will escalate antibiotic to Keflex 4 times daily with Bactrim. Directed use topical antibiotic ointment and warm compresses. CONSULTS: (Who and What was discussed)  None          Chronic Conditions: No past medical history on file.       Records Reviewed (source and summary):     Disposition Considerations (include 1 Tests not done, Admit vs D/C, Shared Decision Making, Pt Expectation of Test or Tx.): Considered obtaining imaging studies of this that this would be of very little benefit if there is abscess as the incision site is likely draining and will improve with antibiotics. Improved on reevaluation. Symptomatic treatment with expectant management discussed with the patient and they and/or family members present are amenable to treatment plan and outpatient follow-up. Strict return precautions were discussed with the patient and those present. They demonstrated understanding of when to return to the emergency department for new or worsening symptoms. I am the Primary Clinician of Record. FINAL IMPRESSION      1. Visit for wound check          DISPOSITION/PLAN     DISPOSITION Decision To Discharge 01/07/2023 10:49:07 PM      PATIENT REFERRED TO:  BELINDA BRAVO-[facility] [address]  [address] [facility]  [phone]    Schedule an appointment as soon as possible for a visit   As needed      DISCHARGE MEDICATIONS:  Discharge Medication List as of 1/7/2023 10:58 PM        START taking these medications    Details   sulfamethoxazole-trimethoprim (BACTRIM DS) 800-160 MG per tablet Take 1 tablet by mouth 2 times daily for 10 days, Disp-1 tablet, R-0Normal      acetaminophen (TYLENOL) 500 MG tablet Take 1 tablet by mouth 4 times daily as needed for Pain, Disp-120 tablet, R-0Normal      !! cephALEXin (KEFLEX) 500 MG capsule Take 1 capsule by mouth 4 times daily for 7 days, Disp-28 capsule, R-0Normal      neomycin-bacitracin-polymyxin (NEOSPORIN) 400-5-5000 ointment Apply topically 2 times daily. , Disp-50 g, R-0, Normal       !! - Potential duplicate medications found. Please discuss with provider.           DISCONTINUED MEDICATIONS:  Discharge Medication List as of 1/7/2023 10:58 PM                 (Please note that portions of this note were completed with a voice recognition program.  Efforts were made to edit the dictations but occasionally words are mis-transcribed.)    Jose F Guadalupe MD (electronically signed)           Jose F Guadalupe MD  01/10/23 [phone]

## 2023-01-12 ENCOUNTER — OFFICE VISIT (OUTPATIENT)
Dept: ENT CLINIC | Age: 23
End: 2023-01-12

## 2023-01-12 VITALS
RESPIRATION RATE: 16 BRPM | HEART RATE: 96 BPM | OXYGEN SATURATION: 99 % | HEIGHT: 57 IN | DIASTOLIC BLOOD PRESSURE: 62 MMHG | WEIGHT: 105 LBS | SYSTOLIC BLOOD PRESSURE: 99 MMHG | BODY MASS INDEX: 22.65 KG/M2

## 2023-01-12 DIAGNOSIS — H93.8X1 EAR SWELLING, RIGHT: ICD-10-CM

## 2023-01-12 DIAGNOSIS — H92.01 RIGHT EAR PAIN: Primary | ICD-10-CM

## 2023-01-12 PROCEDURE — 99024 POSTOP FOLLOW-UP VISIT: CPT | Performed by: STUDENT IN AN ORGANIZED HEALTH CARE EDUCATION/TRAINING PROGRAM

## 2023-01-12 NOTE — DISCHARGE INSTRUCTIONS
Apply Band-Aid daily. Cleanse with only soap and water. Continue to complete your oral antibiotic therapy: Keflex given to you several days ago.

## 2023-01-12 NOTE — ED PROVIDER NOTES
[facility]  eMERGENCY dEPARTMENT eNCOUnter    Pt Name: @@  MRN: [id number]  Birthdate2000  Date of evaluation: 1/11/2023  Provider: CONNOR Penn CNP      Independently evaluated by the advanced practice provider    [address] [place] [address]       Chief Complaint   Patient presents with    Other     Patient came to ed complaint of leaking fluid from stiches on her right ear. Patient states she got the stiches placed on the fourth, and was seen again on the seventh and yesterday for pain/leakage. Patient denies pain at this time. Patient is a/o x4          HISTORY OF PRESENT ILLNESS  (Location/Symptom, Timing/Onset, Context/Setting, Quality, Duration, Modifying Factors, Severity.)   Devin Rosas is a 23 y.o. female who presents to the emergencydepartment PMHx: Appendectomy      HPI provided by the patient  Patient presented with complaints of yellowish drainage from the posterior lower earlobe. States that 3 to 4 days ago she had an earring back that was embedded removed from the earlobe by ENT. She is on antibiotics. Nursing Notes were reviewed and I agree. REVIEW OF SYSTEMS    (2-9 systems for level 4, 10 or more for level 5)     Review of Systems   Skin:         As stated in HPI     Except as noted above the remainder of the review of systems was reviewed and negative. PAST MEDICAL HISTORY   History reviewed. No pertinent past medical history.     SURGICAL HISTORY           Procedure Laterality Date    APPENDECTOMY         CURRENT MEDICATIONS       Previous Medications    ACETAMINOPHEN (TYLENOL) 500 MG TABLET    Take 1 tablet by mouth 4 times daily as needed for Pain    CEPHALEXIN (KEFLEX) 500 MG CAPSULE    Take 1 capsule by mouth 4 times daily for 7 days    IBUPROFEN (ADVIL;MOTRIN) 600 MG TABLET    Take 1 tablet by mouth 4 times daily as needed for Pain    NEOMYCIN-BACITRACIN-POLYMYXIN (NEOSPORIN) 400-5-5000 OINTMENT    Apply topically 2 times daily.    SULFAMETHOXAZOLE-TRIMETHOPRIM (BACTRIM DS) 800-160 MG PER TABLET    Take 1 tablet by mouth 2 times daily for 10 days       ALLERGIES     Patient has no known allergies. FAMILY HISTORY     History reviewed. No pertinent family history. No family status information on file. SOCIAL HISTORY      reports that she has never smoked. She has never used smokeless tobacco. She reports that she does not drink alcohol and does not use drugs. PHYSICAL EXAM    (up to 7 for level 4, 8 or more for level 5)      ED Triage Vitals [01/11/23 2115]   BP Temp Temp Source Heart Rate Resp SpO2 Height Weight   118/77 98.3 °F (36.8 °C) Oral 89 18 98 % -- --       Physical Exam  Vitals and nursing note reviewed. HENT:      Head:      Comments: External earlobe for the most part other than the incision wound is unremarkable. The open hole measures approximately 8 mm. Has approximately 3 to 4 mm depth. No surrounding erythema. I believe that the clotted material that I removed that essentially fell off was scab, fluid collection from the incisional site, foreign body pocket as well as possible collection of suture that dehisced. I cleaned the area. The second photograph shows the foreign body pocket being completely clear of any foreign body. There is no longer evidence of abscess or surrounding cellulitis. Cardiovascular:      Rate and Rhythm: Normal rate. Pulmonary:      Effort: Pulmonary effort is normal.   Neurological:      Mental Status: She is alert.              DIAGNOSTIC RESULTS     RADIOLOGY:   Non-plain film images such as CT, Ultrasound and MRI are read by the radiologist. Plain radiographic images are visualized and preliminarily interpreted by CONNOR Leyva CNP with the below findings:        Interpretation per the Radiologist below, if available at the time of this note:    No orders to display       LABS:  Labs Reviewed - No data to display    All other labs were within normal range or not returned as of this dictation. EMERGENCY DEPARTMENT COURSE and DIFFERENTIAL DIAGNOSIS/MDM:   Vitals:    Vitals:    01/11/23 2115   BP: 118/77   Pulse: 89   Resp: 18   Temp: 98.3 °F (36.8 °C)   TempSrc: Oral   SpO2: 98%     Medications - No data to display    MDM    Patient was seen and evaluated per myself. Dr. Mirta Nicole present available for consultation as needed. On my exam the posterior earlobe has a large scab that is soft and obstructive to the incision site. I removed the scab and this allowed a compressed amount of pus drainage to evacuate spontaneously. The surgical site hole is clean and granulating. There is no evidence of earlobe cellulitis. Patient can continue on her antibiotic therapy, follow-up with ENT and she can be safely discharged home. By chart review patient was seen on December 27 for earlobe pain related to a retained earring back that had been stuck for years. On January 4 ENT evaluated her and remove the foreign body, placed several simple interrupted sutures  On January 7 patient returned to the emergency department wanting a wound check. At that point in time she was given a prescription for Keflex. Given the fact that several days have passed, and the sutures clearly are not approximating the wound I feel that at this point in time it needs to be left open and granulate with tertiary closure. SDM: Plan of care was discussed with the patient. She is in agreement  Patient is to continue with the Keflex. She was provided Band-Aids for discharge and instructed to use soap and water daily for cleansing. Below I have also listed follow-up with ENT if she has any further concerns. PROCEDURES:  Procedures    FINAL IMPRESSION      1.  Visit for wound check          DISPOSITION/PLAN   DISPOSITION Decision To Discharge 01/11/2023 09:39:20 PM      PATIENT REFERRED TO:  Elliott Weiner MD  [address] [facility] [address] [place] [address] [facility] [address]  [phone]      As needed, If symptoms worsen    [facility]  [address]  [phone]    As needed, If symptoms worsen    DISCHARGE MEDICATIONS:  New Prescriptions    No medications on file       (Please note that portions of this note werecompleted with a voice recognition program.  Efforts were made to edit the dictations but occasionally words are mis-transcribed.)    Dane Frey, APRN - CNP      This dictation was performed with a verbal recognition program (DRAGON) and it was checked for errors. It is possible that there are still dictated errors within this office note. If so, please bring any errors to my attention for an addendum. All efforts were made to ensure that this office note is accurate.        Florencia Gutierrez, CONNOR - CNP  01/11/23 9703

## 2023-01-12 NOTE — PROGRESS NOTES
[address] (:  2000) is a 23 y.o. female, here for evaluation of the following chief complaint(s):  Follow-up      ASSESSMENT/PLAN:  1. Right ear pain  2. Ear swelling, right    This is a very pleasant 23 y.o. female here today for evaluation of the the above-noted complaints.      -Patient had dehiscence of her excision site. There is no evidence of infection today. Discussed different treatment options including allowing this to granulate in versus closing it today. I advised closing it, however the patient was hesitant to do so and wanted to allow to granulate in. This area was cleaned and Steri-Strips were applied.  -Continue antibiotics  -Follow-up in 1 week sooner if there are new issues    Medical Decision Making: The following items were considered in medical decision making:  Independent review of images  Review / order clinical lab tests  Review / order radiology tests  Decision to obtain old records  Review and summation of old records as accessed through [employer] if applicable    SUBJECTIVE/OBJECTIVE:  CORIE Reilly is here today for evaluation of ear issues. The patient has been seen in the emergency department multiple times for this. Visit notes from those visits were reviewed. She at first was prescribed Allegra for her symptoms but these did not improve it. She went back a second time and was told that she has a possible foreign body in her right lobule. There was some concern that this could have been an infection. Patient states that she used to have her ears pierced. She thinks that there may be an earring back retained in her right lobule that was never removed. She reportedly had an x-ray for this which was negative.     Patient has had episodes related to pharyngitis for which she has been seen in the emergency department for in the past.  No recent episodes of tonsil infections. She was COVID-positive over the summer. Update 1/12/2023:    Patient was seen in the emergency department. There was some concern that wound was infected. They extended her Keflex. She has been having some drainage and crusting. REVIEW OF SYSTEMS  The following systems were reviewed and revealed the following in addition to any already discussed in the HPI:    PHYSICAL EXAM    GENERAL: No acute distress, alert and oriented, no hoarseness, strong voice  EYES: EOMI, Anti-icteric  HENT:   Head: Normocephalic and atraumatic. Face:  Symmetric, facial nerve intact  Right Ear: On exam, the excision site had split open. There is healthy-appearing granulation tissue. The wound was then copiously cleaned. The wound edges were reapproximated and Steri-Strips were applied over top of this.

## 2023-01-12 NOTE — ED NOTES
.Pt discharged at this time. Discharge instructions and medications reviewed,  Questions were answered. PT verbalized understanding. VSS, Afebrile. Follow up appointments were discussed.          Jacqueline Yousif RN  01/11/23 9964

## 2023-01-13 ENCOUNTER — HOSPITAL ENCOUNTER (EMERGENCY)
Age: 23
Discharge: HOME | End: 2023-01-13
Attending: EMERGENCY MEDICINE
Payer: COMMERCIAL

## 2023-01-13 VITALS
RESPIRATION RATE: 18 BRPM | TEMPERATURE: 98.4 F | SYSTOLIC BLOOD PRESSURE: 104 MMHG | OXYGEN SATURATION: 100 % | DIASTOLIC BLOOD PRESSURE: 67 MMHG | WEIGHT: 106.7 LBS | BODY MASS INDEX: 21.51 KG/M2 | HEIGHT: 59 IN | HEART RATE: 85 BPM

## 2023-01-13 DIAGNOSIS — H10.9 CONJUNCTIVITIS OF LEFT EYE, UNSPECIFIED CONJUNCTIVITIS TYPE: Primary | ICD-10-CM

## 2023-01-13 PROCEDURE — 99283 EMERGENCY DEPT VISIT LOW MDM: CPT

## 2023-01-13 RX ORDER — ERYTHROMYCIN 5 MG/G
OINTMENT OPHTHALMIC
Qty: 3.5 G | Refills: 0 | Status: SHIPPED | OUTPATIENT
Start: 2023-01-13 | End: 2023-01-18

## 2023-01-13 ASSESSMENT — ENCOUNTER SYMPTOMS
PHOTOPHOBIA: 0
EYE ITCHING: 1
EYE PAIN: 0
EYE REDNESS: 0
EYE DISCHARGE: 0

## 2023-01-13 ASSESSMENT — VISUAL ACUITY
OS: 20/20
OU: 20/20
OD: 20/25

## 2023-01-13 ASSESSMENT — PAIN - FUNCTIONAL ASSESSMENT
PAIN_FUNCTIONAL_ASSESSMENT: NONE - DENIES PAIN
PAIN_FUNCTIONAL_ASSESSMENT: NONE - DENIES PAIN

## 2023-01-13 NOTE — ED PROVIDER NOTES
[address] [facility] ENCOUNTER      Pt Name: Violette Guan  MRN: [id number]  [place] 2000  Date of evaluation: 1/13/2023  Provider: Isabel Rucker MD    [address] [place] [address]       Chief Complaint   Patient presents with    Eye Problem     Irritated red skin around eye x 1 day, no visual changes         HISTORY OF PRESENT ILLNESS   (Location/Symptom, Timing/Onset, Context/Setting, Quality, Duration, Modifying Factors, Severity)  Note limiting factors. Violette Guan is a 23 y.o. female who presents to the emergency department with the chief complaint of   Chief Complaint   Patient presents with    Eye Problem     Irritated red skin around eye x 1 day, no visual changes   . Patient states that she woke up this morning with her eye itching. Patient states that she has little bit of swelling of her upper and lower lids. Patient denies any discharge from the eye. She denies any change in her vision. She is never had a problem like this before. She does not take any current medications. No other complaints or problems. The patient does not use contacts      Nursing Notes were reviewed. REVIEW OF SYSTEMS    (2-9 systems for level 4, 10 or more for level 5)     Review of Systems   Constitutional:  Negative for chills and fever. Eyes:  Positive for itching. Negative for photophobia, pain, discharge, redness and visual disturbance. Except as noted above the remainder of the review of systems was reviewed and negative. PAST MEDICAL HISTORY   History reviewed. No pertinent past medical history.       SURGICAL HISTORY       Past Surgical History:   Procedure Laterality Date    APPENDECTOMY           CURRENT MEDICATIONS       Previous Medications    ACETAMINOPHEN (TYLENOL) 500 MG TABLET    Take 1 tablet by mouth 4 times daily as needed for Pain    CEPHALEXIN (KEFLEX) 500 MG CAPSULE    Take 1 capsule by mouth 4 times daily for 7 days    IBUPROFEN (ADVIL;MOTRIN) 600 MG TABLET    Take 1 tablet by mouth 4 times daily as needed for Pain    NEOMYCIN-BACITRACIN-POLYMYXIN (NEOSPORIN) 400-5-5000 OINTMENT    Apply topically 2 times daily. SULFAMETHOXAZOLE-TRIMETHOPRIM (BACTRIM DS) 800-160 MG PER TABLET    Take 1 tablet by mouth 2 times daily for 10 days       ALLERGIES     Patient has no known allergies. FAMILY HISTORY     History reviewed. No pertinent family history. SOCIAL HISTORY       Social History     Socioeconomic History    Marital status: Single     Spouse name: None    Number of children: None    Years of education: None    Highest education level: None   Tobacco Use    Smoking status: Never    Smokeless tobacco: Never   Vaping Use    Vaping Use: Never used   Substance and Sexual Activity    Alcohol use: No    Drug use: No    Sexual activity: Never     Social Determinants of Health     Financial Resource Strain: Low Risk     Difficulty of Paying Living Expenses: Not hard at all   Food Insecurity: No Food Insecurity    Worried About [employer] in the Last Year: Never true    [employer] in the Last Year: Never true       SCREENINGS         [place] Coma Scale  Eye Opening: Spontaneous  Best Verbal Response: Oriented  Best Motor Response: Obeys commands  [place] Coma Scale Score: 15                     CIWA Assessment  BP: 104/67  Heart Rate: 85                 PHYSICAL EXAM    (up to 7 for level 4, 8 or more for level 5)     ED Triage Vitals [01/13/23 0939]   BP Temp Temp Source Heart Rate Resp SpO2 Height Weight   104/67 98.4 °F (36.9 °C) Oral 85 18 100 % 4' 11\" (1.499 m) 106 lb 11.2 oz (48.4 kg)       Physical Exam  Constitutional:       Appearance: Normal appearance. HENT:      Head: Normocephalic and atraumatic. Nose: Nose normal.   Eyes:      Extraocular Movements: Extraocular movements intact. Conjunctiva/sclera: Conjunctivae normal.      Pupils: Pupils are equal, round, and reactive to light. Comments:  The patient has a slight swelling of the upper and lower lids but no evidence of stye. The patient has no redness to the eye no discharge. Pupils are equal round and reactive to light. Neurological:      Mental Status: She is alert. DIAGNOSTIC RESULTS     EMERGENCY DEPARTMENT COURSE and DIFFERENTIAL DIAGNOSIS/MDM:   Vitals:    Vitals:    01/13/23 0939   BP: 104/67   Pulse: 85   Resp: 18   Temp: 98.4 °F (36.9 °C)   TempSrc: Oral   SpO2: 100%   Weight: 106 lb 11.2 oz (48.4 kg)   Height: 4' 11\" (1.499 m)     Patient was given the following medications:  Medications - No data to display       Is this patient to be included in the SEP-1 Core Measure due to severe sepsis or septic shock? No   Exclusion criteria - the patient is NOT to be included for SEP-1 Core Measure due to:  2+ SIRS criteria are not met        I am the Primary Clinician of Record. MDM  Number of Diagnoses or Management Options  Conjunctivitis of left eye, unspecified conjunctivitis type  Diagnosis management comments: The patient may be having an allergic conjunctivitis but I Brittany Mike go and treat with antibiotics as a precaution she also could be an early stye. At this point I will treat her for both possible bacterial conjunctivitis and the early stye. Patient is very comfortable with this plan. The patient's visual acuity is 20/20 on the left and 20/25 on the right          FINAL IMPRESSION      1. Conjunctivitis of left eye, unspecified conjunctivitis type          DISPOSITION/PLAN   DISPOSITION Decision To Discharge 01/13/2023 10:15:40 AM      PATIENT REFERRED TO:  No follow-up provider specified. DISCHARGE MEDICATIONS:  New Prescriptions    ERYTHROMYCIN (ROMYCIN) 5 MG/GM OPHTHALMIC OINTMENT    Use 4 times daily in right eye. Controlled Substances Monitoring:     No flowsheet data found.     (Please note that portions of this note were completed with a voice recognition program.  Efforts were made to edit the dictations but occasionally words are mis-transcribed. )    Dawn Alvarez MD (electronically signed)  Attending Emergency Physician            Kelvin Ladd MD  01/13/23 5694

## 2023-01-13 NOTE — DISCHARGE INSTRUCTIONS
Follow-up with your primary care doctor if you are not completely better in 3 to 5 days sooner if worse or problems. Return immediately if any concerns or your symptoms worsen.   Warm soaks to your left eyelids 4 times a day for 3 days

## 2023-01-14 ENCOUNTER — HOSPITAL ENCOUNTER (EMERGENCY)
Age: 23
Discharge: HOME | End: 2023-01-14
Payer: COMMERCIAL

## 2023-01-14 VITALS
HEART RATE: 77 BPM | BODY MASS INDEX: 22.88 KG/M2 | RESPIRATION RATE: 18 BRPM | HEIGHT: 57 IN | DIASTOLIC BLOOD PRESSURE: 58 MMHG | SYSTOLIC BLOOD PRESSURE: 101 MMHG | TEMPERATURE: 97.4 F | WEIGHT: 106.04 LBS | OXYGEN SATURATION: 98 %

## 2023-01-14 DIAGNOSIS — H57.89 EYE SWELLING, BILATERAL: Primary | ICD-10-CM

## 2023-01-14 PROCEDURE — 99283 EMERGENCY DEPT VISIT LOW MDM: CPT

## 2023-01-14 RX ORDER — PHENYLEPHRINE/DIPHENHYDRAMINE 5-12.5MG/5
25 SOLUTION, ORAL ORAL EVERY 8 HOURS PRN
Qty: 840 ML | Refills: 0 | Status: SHIPPED | OUTPATIENT
Start: 2023-01-14 | End: 2023-01-24

## 2023-01-14 RX ORDER — PREDNISONE 5 MG/ML
20 SOLUTION ORAL 2 TIMES DAILY
Qty: 280 ML | Refills: 0 | Status: SHIPPED | OUTPATIENT
Start: 2023-01-14 | End: 2023-01-21

## 2023-01-14 ASSESSMENT — PAIN - FUNCTIONAL ASSESSMENT
PAIN_FUNCTIONAL_ASSESSMENT: NONE - DENIES PAIN
PAIN_FUNCTIONAL_ASSESSMENT: NONE - DENIES PAIN

## 2023-01-14 ASSESSMENT — VISUAL ACUITY
OU: 20/10
OD: 20/10
OS: 20/10

## 2023-01-14 NOTE — ED TRIAGE NOTES
Pt states that have bilateral eye swelling. Went to urgent care yesterday and given erythromycin ointment, states it is making it worse.

## 2023-01-14 NOTE — ED PROVIDER NOTES
[facility]        Pt Name: Shabana Hall  MRN: [id number]  [place] 2000  Date of evaluation: 1/14/2023  Provider: Jose Atkins PA-C  PCP: No primary care provider on file. Note Started: 1:35 PM EST 1/14/23      TRIPP. I have evaluated this patient. My supervising physician was available for consultation. CHIEF COMPLAINT       Chief Complaint   Patient presents with    Facial Swelling     Pt states that have bilateral eye swelling with itching and redness. Went to urgent care yesterday and given erythromycin ointment, states it is making it worse. HISTORY OF PRESENT ILLNESS: 1 or more Elements     History from : Patient    Limitations to history : None    Lali Streeter is a 23 y.o. female who presents to the emergency department with complaint of a rash around her left eye and now her right eye that has been present over the past couple of days. It is itchy, not necessarily painful. She has no change in her vision. She was given erythromycin at [facility] yesterday and she feels like her symptoms are worsening. She denies any new make-up, detergent, foods. However she does state that she just finished taking medication for an ear issue, new antibiotics that she has not taken in the past.  Finished 2 days ago. She denies any fever, chills shortness of breath, trouble with swallowing, trouble breathing, chest pain. Nursing Notes were all reviewed and agreed with or any disagreements were addressed in the HPI. REVIEW OF SYSTEMS :      Review of Systems   All other systems reviewed and are negative. Positives and Pertinent negatives as per HPI.      SURGICAL HISTORY     Past Surgical History:   Procedure Laterality Date    APPENDECTOMY         CURRENTMEDICATIONS       Discharge Medication List as of 1/14/2023 10:30 AM        CONTINUE these medications which have NOT CHANGED    Details erythromycin (ROMYCIN) 5 MG/GM ophthalmic ointment Use 4 times daily in right eye., Disp-3.5 g, R-0, Normal      sulfamethoxazole-trimethoprim (BACTRIM DS) 800-160 MG per tablet Take 1 tablet by mouth 2 times daily for 10 days, Disp-1 tablet, R-0Normal      acetaminophen (TYLENOL) 500 MG tablet Take 1 tablet by mouth 4 times daily as needed for Pain, Disp-120 tablet, R-0Normal      cephALEXin (KEFLEX) 500 MG capsule Take 1 capsule by mouth 4 times daily for 7 days, Disp-28 capsule, R-0Normal      neomycin-bacitracin-polymyxin (NEOSPORIN) 400-5-5000 ointment Apply topically 2 times daily. , Disp-50 g, R-0, Normal      ibuprofen (ADVIL;MOTRIN) 600 MG tablet Take 1 tablet by mouth 4 times daily as needed for Pain, Disp-50 tablet, R-1Print             ALLERGIES     Patient has no known allergies. FAMILYHISTORY     History reviewed. No pertinent family history. SOCIAL HISTORY       Social History     Tobacco Use    Smoking status: Never    Smokeless tobacco: Never   Vaping Use    Vaping Use: Never used   Substance Use Topics    Alcohol use: No    Drug use: No       SCREENINGS        [place] Coma Scale  Eye Opening: Spontaneous  Best Verbal Response: Oriented  Best Motor Response: Obeys commands  [place] Coma Scale Score: 15                CIWA Assessment  BP: (!) 101/58  Heart Rate: 77           PHYSICAL EXAM  1 or more Elements     ED Triage Vitals [01/14/23 0948]   BP Temp Temp Source Heart Rate Resp SpO2 Height Weight   (!) 101/58 97.4 °F (36.3 °C) Oral 77 18 98 % 4' 9\" (1.448 m) 106 lb 0.7 oz (48.1 kg)       Physical Exam  Constitutional:       General: She is not in acute distress. Appearance: Normal appearance. She is not ill-appearing, toxic-appearing or diaphoretic. HENT:      Head: Normocephalic and atraumatic. Right Ear: External ear normal.      Left Ear: External ear normal.      Nose: Nose normal.      Mouth/Throat:      Mouth: Mucous membranes are moist.      Pharynx: Oropharynx is clear. No oropharyngeal exudate. Eyes:      General:         Right eye: No discharge. Left eye: No discharge. Extraocular Movements: Extraocular movements intact. Conjunctiva/sclera: Conjunctivae normal.      Pupils: Pupils are equal, round, and reactive to light. Comments: Mild erythema around her left and right eye with a little bit of excoriation, very minimal edema, no difficulty with eye movement, no change in vision  Visual acuity within normal limits   Cardiovascular:      Rate and Rhythm: Normal rate and regular rhythm. Pulses: Normal pulses. Heart sounds: Normal heart sounds. No murmur heard. No gallop. Pulmonary:      Effort: Pulmonary effort is normal. No respiratory distress. Breath sounds: Normal breath sounds. No stridor. No wheezing, rhonchi or rales. Musculoskeletal:         General: Normal range of motion. Cervical back: Normal range of motion. Skin:     General: Skin is warm and dry. Neurological:      General: No focal deficit present. Mental Status: She is alert and oriented to person, place, and time. Psychiatric:         Mood and Affect: Mood normal.         Behavior: Behavior normal.       DIAGNOSTIC RESULTS   LABS:    Labs Reviewed - No data to display    When ordered only abnormal lab results are displayed. All other labs were within normal range or not returned as of this dictation. EKG: When ordered, EKG's are interpreted by the Emergency Department Physician in the absence of a cardiologist.  Please see their note for interpretation of EKG. RADIOLOGY:   Non-plain film images such as CT, Ultrasound and MRI are read by the radiologist. Plain radiographic images are visualized and preliminarily interpreted by the ED Provider with the below findings:    Interpretation per the Radiologist below, if available at the time of this note:    No orders to display     No results found. No results found.     PROCEDURES   Unless otherwise noted below, none     Procedures    CRITICAL CARE TIME (.cctime)       PAST MEDICAL HISTORY      has no past medical history on file. Chronic Conditions affecting Care:    EMERGENCY DEPARTMENT COURSE and DIFFERENTIAL DIAGNOSIS/MDM:   Vitals:    Vitals:    01/14/23 0948   BP: (!) 101/58   Pulse: 77   Resp: 18   Temp: 97.4 °F (36.3 °C)   TempSrc: Oral   SpO2: 98%   Weight: 106 lb 0.7 oz (48.1 kg)   Height: 4' 9\" (1.448 m)       Patient was given the following medications:  Medications - No data to display          Is this patient to be included in the SEP-1 Core Measure due to severe sepsis or septic shock? No   Exclusion criteria - the patient is NOT to be included for SEP-1 Core Measure due to: Infection is not suspected    CONSULTS: (Who and What was discussed)  None              CC/HPI Summary, DDx, ED Course, and Reassessment: This is a 23y.o. year old, well-appearing female with  has no past medical history on file. who presents to the ED with complaint of erythema and itching around her eyes that began 2 days ago. Vitals upon arrival show soft BPs but states that is normal for her  Physical Exam shows as above. Ddx includes: Bacterial infection, allergic reaction, allergic conjunctivitis, bacterial conjunctivitis, cellulitis, other    Management Given:  -Steroid and Benadryl for home since she drove and she wants liquid pills    Disposition: Discharge  Patient was informed to return to the Emergency Department if any new worsening or more concerning symptoms occur, in agreement with plan. Shared decision making was practiced, and patient discharged in stable condition. Informed to follow up with PCP  for further evaluation. Medications were prescribed as below. All questions were answered.        Disposition Considerations (include 1 Tests not done, Shared Decision Making, Pt Expectation of Test or Tx.): I consider doing more of a work-up with her by giving her IV medications to help with a allergic reaction. She did recently come off some antibiotics so I believe that this is related to those medications. She will write these down and to be aware of the next time that she has to take them. She will stop using the erythromycin ointment. I did give her some medications that are p.o., Benadryl and steroids to help with the inflammation. We did discuss returning to the ED if her symptoms worsen or new concerning symptoms present such as shortness of breath, trouble swallowing, fevers, chills, or any other symptoms that seem bothersome or if she is not getting improved. She will call her primary care physician on Monday to schedule an appointment as soon as possible. She was in agreement with this plan. She was discharged in stable condition. I am the Primary Clinician of Record. FINAL IMPRESSION      1.  Eye swelling, bilateral          DISPOSITION/PLAN     DISPOSITION Decision To Discharge 01/14/2023 10:34:24 AM      PATIENT REFERRED TO:  PCP  Call your PCp to schedule a follow up appointment as soon as possible for further evaluation        [facility] Emergency Department  [address] [facility] [id number]  [phone]  Go to   As needed, If symptoms worsen      DISCHARGE MEDICATIONS:  Discharge Medication List as of 1/14/2023 10:30 AM        START taking these medications    Details   diphenhydrAMINE-phenylephrine (BENADRYL ALLERGY CHILDRENS) 12.5-5 MG/5ML SOLN Take 25 mg by mouth every 8 hours as needed (itching), Disp-840 mL, R-0Normal      predniSONE 5 MG/5ML solution Take 20 mLs by mouth 2 times daily for 7 days, Disp-280 mL, R-0Normal             DISCONTINUED MEDICATIONS:  Discharge Medication List as of 1/14/2023 10:30 AM                 (Please note that portions of this note were completed with a voice recognition program.  Efforts were made to edit the dictations but occasionally words are mis-transcribed.)    Lavinia Moser PA-C (electronically signed)            Jeferson Patino PA-C  01/14/23 1536

## 2023-01-18 ENCOUNTER — OFFICE VISIT (OUTPATIENT)
Dept: INTERNAL MEDICINE CLINIC | Age: 23
End: 2023-01-18
Payer: COMMERCIAL

## 2023-01-18 VITALS
TEMPERATURE: 97.2 F | BODY MASS INDEX: 23.37 KG/M2 | SYSTOLIC BLOOD PRESSURE: 97 MMHG | OXYGEN SATURATION: 99 % | HEART RATE: 81 BPM | DIASTOLIC BLOOD PRESSURE: 67 MMHG | WEIGHT: 108 LBS

## 2023-01-18 DIAGNOSIS — L30.9 PERIORBITAL DERMATITIS: ICD-10-CM

## 2023-01-18 DIAGNOSIS — Z09 HOSPITAL DISCHARGE FOLLOW-UP: Primary | ICD-10-CM

## 2023-01-18 PROCEDURE — 1111F DSCHRG MED/CURRENT MED MERGE: CPT | Performed by: STUDENT IN AN ORGANIZED HEALTH CARE EDUCATION/TRAINING PROGRAM

## 2023-01-18 PROCEDURE — 99215 OFFICE O/P EST HI 40 MIN: CPT | Performed by: STUDENT IN AN ORGANIZED HEALTH CARE EDUCATION/TRAINING PROGRAM

## 2023-01-18 RX ORDER — FLUCONAZOLE 150 MG/1
150 TABLET ORAL DAILY
Qty: 2 TABLET | Refills: 0 | Status: SHIPPED | OUTPATIENT
Start: 2023-01-18 | End: 2023-01-20

## 2023-01-18 RX ORDER — CLOTRIMAZOLE AND BETAMETHASONE DIPROPIONATE 10; .64 MG/G; MG/G
CREAM TOPICAL
Qty: 45 G | Refills: 0 | Status: SHIPPED | OUTPATIENT
Start: 2023-01-18

## 2023-01-18 NOTE — PROGRESS NOTES
Post-Discharge Transitional Care  Follow Up      [facility]   YOB: 2000    Date of Office Visit:  1/18/2023  Date of Hospital Admission: 1/14/23  Date of Hospital Discharge: 1/14/23  Risk of hospital readmission (high >=14%. Medium >=10%) :No data recorded    Care management risk score Rising risk (score 2-5) and Complex Care (Scores >=6): No Risk Score On File     Non face to face  following discharge, date last encounter closed (first attempt may have been earlier): *No documented post hospital discharge outreach found in the last 14 days    Call initiated 2 business days of discharge: *No response recorded in the last 14 days    ASSESSMENT/PLAN:   Hospital discharge follow-up  -     MD DISCHARGE MEDS RECONCILED W/ CURRENT OUTPATIENT MED LIST  Periorbital dermatitis-continues to get worse-intense pruritus and continued spreading. Started steroids since yesterday. Has been on Benadryl regularly. Start on Lotrisone and fluconazole. -     fluconazole (DIFLUCAN) 150 MG tablet; Take 1 tablet by mouth daily for 2 days, Disp-2 tablet, R-0Normal  -     clotrimazole-betamethasone (LOTRISONE) 1-0.05 % cream; Apply topically 2 times daily. , Disp-45 g, R-0, Normal  -     [facility] - Dee Guevara MD, Allergy & Immunology, [facility]    Medical Decision Making: high complexity  Return if symptoms worsen or fail to improve. Subjective:   HPI:  Follow up of Hospital problems/diagnosis(es): Patient was on antibiotic for her ear infection, she was seen on 1/13 to urgent care the for conjunctivitis of left eye and was prescribed with erythromycin ointment. Following this she presented to ED on 1/14 with worsening of swelling in her bilateral eye. She was discharged with Benadryl and prednisone. Inpatient course: Discharge summary reviewed- see chart. Interval history/Current status: Symptoms are not improved. She continues to have intense pruritus and spreading of the lesions.   She was not able to get the steroids from the pharmacy and could only refill it from yesterday. There is no problem list on file for this patient. Medications listed as ordered at the time of discharge from hospital     Medication List            Accurate as of January 18, 2023 10:53 AM. If you have any questions, ask your nurse or doctor. START taking these medications      clotrimazole-betamethasone 1-0.05 % cream  Commonly known as: Lotrisone  Apply topically 2 times daily. Started by: Herlinda Croft MD     fluconazole 150 MG tablet  Commonly known as: DIFLUCAN  Take 1 tablet by mouth daily for 2 days  Started by: Herlinda Croft MD            CONTINUE taking these medications      Benadryl Allergy Childrens 12.5-5 MG/5ML Soln  Generic drug: diphenhydrAMINE-phenylephrine  Take 25 mg by mouth every 8 hours as needed (itching)     predniSONE 5 MG/5ML solution  Take 20 mLs by mouth 2 times daily for 7 days               Where to Get Your Medications        These medications were sent to [facility] [id number] [address]  [phone] [facility] [phone]  [facility] [address]      Phone: [phone]   clotrimazole-betamethasone 1-0.05 % cream  fluconazole 150 MG tablet           Medications marked \"taking\" at this time  Outpatient Medications Marked as Taking for the 1/18/23 encounter (Office Visit) with Herlinda Croft MD   Medication Sig Dispense Refill    fluconazole (DIFLUCAN) 150 MG tablet Take 1 tablet by mouth daily for 2 days 2 tablet 0    clotrimazole-betamethasone (LOTRISONE) 1-0.05 % cream Apply topically 2 times daily.  45 g 0    diphenhydrAMINE-phenylephrine (BENADRYL ALLERGY CHILDRENS) 12.5-5 MG/5ML SOLN Take 25 mg by mouth every 8 hours as needed (itching) 840 mL 0    predniSONE 5 MG/5ML solution Take 20 mLs by mouth 2 times daily for 7 days 280 mL 0        Medications patient taking as of now reconciled against medications ordered at time of hospital discharge: Yes    A comprehensive review of systems was negative except for what was noted in the HPI. Objective:    BP 97/67 (Site: Left Upper Arm, Position: Sitting, Cuff Size: Medium Adult)   Pulse 81   Temp 97.2 °F (36.2 °C) (Temporal)   Wt 108 lb (49 kg)   SpO2 99%   BMI 23.37 kg/m²   General Appearance: alert and oriented to person, place and time, well developed and well- nourished, in no acute distress  Skin: warm and dry, no rash or erythema  Eyes: Erythematous scaly rashes as described in the picture. ENT: Right postauricular wounds, in the healing phase. Pulmonary/Chest: clear to auscultation bilaterally- no wheezes, rales or rhonchi, normal air movement, no respiratory distress  Cardiovascular: normal rate, regular rhythm, normal S1 and S2, no murmurs, rubs, clicks, or gallops, distal pulses intact, no carotid bruits      An electronic signature was used to authenticate this note.   --Joleen Hartman MD

## 2023-01-26 ENCOUNTER — OFFICE VISIT (OUTPATIENT)
Dept: ENT CLINIC | Age: 23
End: 2023-01-26
Payer: COMMERCIAL

## 2023-01-26 VITALS
HEART RATE: 98 BPM | OXYGEN SATURATION: 98 % | DIASTOLIC BLOOD PRESSURE: 63 MMHG | SYSTOLIC BLOOD PRESSURE: 104 MMHG | HEIGHT: 57 IN | WEIGHT: 111 LBS | BODY MASS INDEX: 23.95 KG/M2

## 2023-01-26 DIAGNOSIS — H93.8X1 EAR SWELLING, RIGHT: ICD-10-CM

## 2023-01-26 DIAGNOSIS — Z88.1 ALLERGY TO ANTIBIOTIC: ICD-10-CM

## 2023-01-26 DIAGNOSIS — S01.342A: Primary | ICD-10-CM

## 2023-01-26 PROCEDURE — G8420 CALC BMI NORM PARAMETERS: HCPCS | Performed by: STUDENT IN AN ORGANIZED HEALTH CARE EDUCATION/TRAINING PROGRAM

## 2023-01-26 PROCEDURE — 1036F TOBACCO NON-USER: CPT | Performed by: STUDENT IN AN ORGANIZED HEALTH CARE EDUCATION/TRAINING PROGRAM

## 2023-01-26 PROCEDURE — 99213 OFFICE O/P EST LOW 20 MIN: CPT | Performed by: STUDENT IN AN ORGANIZED HEALTH CARE EDUCATION/TRAINING PROGRAM

## 2023-01-26 PROCEDURE — G8482 FLU IMMUNIZE ORDER/ADMIN: HCPCS | Performed by: STUDENT IN AN ORGANIZED HEALTH CARE EDUCATION/TRAINING PROGRAM

## 2023-01-26 PROCEDURE — G8427 DOCREV CUR MEDS BY ELIG CLIN: HCPCS | Performed by: STUDENT IN AN ORGANIZED HEALTH CARE EDUCATION/TRAINING PROGRAM

## 2023-01-26 NOTE — PROGRESS NOTES
[address] (:  2000) is a 23 y.o. female, here for evaluation of the following chief complaint(s):  Follow-up (ear)      ASSESSMENT/PLAN:  1. Puncture wound of left ear with foreign body, initial encounter  2. Ear swelling, right  3. Allergy to antibiotic    This is a very pleasant 23 y.o. female here today for evaluation of the the above-noted complaints.      -Patient is status post excision of a foreign body in her right lobule. Incision split open after removal.  She was seen in the emergency department for this and placed on antibiotics. It sounds like she developed an allergic reaction to this with rash and swelling around her eyes. The antibiotic was discontinued and she has now been on steroid cream and oral steroids. The rash is dramatically improved. At the site of excision is completely healed. Discussed that she can treat this normally and wash it as she would anywhere else. I will refer her to allergy and immunology for further work-up and evaluation for possible allergy to cephalexin or Bactrim, as these of the antibiotic she was put on by the emergency department. Medical Decision Making: The following items were considered in medical decision making:  Independent review of images  Review / order clinical lab tests  Review / order radiology tests  Decision to obtain old records  Review and summation of old records as accessed through [facility] if applicable    SUBJECTIVE/OBJECTIVE:  CORIE Church is here today for evaluation of ear issues. The patient has been seen in the emergency department multiple times for this. Visit notes from those visits were reviewed. She at first was prescribed Allegra for her symptoms but these did not improve it. She went back a second time and was told that she has a possible foreign body in her right lobule.   There was some concern that this could have been an infection. Patient states that she used to have her ears pierced. She thinks that there may be an earring back retained in her right lobule that was never removed. She reportedly had an x-ray for this which was negative. Patient has had episodes related to pharyngitis for which she has been seen in the emergency department for in the past.  No recent episodes of tonsil infections. She was COVID-positive over the summer. Update 1/12/2023:    Patient was seen in the emergency department. There was some concern that wound was infected. They extended her Keflex. She has been having some drainage and crusting. Update 1/26/2023:    Patient presents today for follow-up. States that she developed rash and swelling around her eyes several days after I last saw her. No pain with the right ear excision. REVIEW OF SYSTEMS  The following systems were reviewed and revealed the following in addition to any already discussed in the HPI:    PHYSICAL EXAM    GENERAL: No acute distress, alert and oriented, no hoarseness, strong voice  EYES: EOMI, Anti-icteric  HENT:   Head: Normocephalic and atraumatic. Face:  Symmetric, facial nerve intact  Right Ear: On exam, the excision site has completely healed. There is no evidence of dehiscence or infection. Resolving maculopapular rash in a periorbital distribution. No facial edema.

## 2023-02-21 ENCOUNTER — TELEPHONE (OUTPATIENT)
Dept: ENT CLINIC | Age: 23
End: 2023-02-21

## 2023-02-21 NOTE — TELEPHONE ENCOUNTER
Pt calling, states a referral was supposed to be sent to [facility] but she has been calling to schedule and they are telling her it was never received.  Can we fax referral for her - please call to let her know when this has been done

## 2023-03-05 ENCOUNTER — HOSPITAL ENCOUNTER (EMERGENCY)
Age: 23
Discharge: HOME | End: 2023-03-05
Attending: EMERGENCY MEDICINE
Payer: COMMERCIAL

## 2023-03-05 VITALS
HEART RATE: 94 BPM | OXYGEN SATURATION: 99 % | SYSTOLIC BLOOD PRESSURE: 112 MMHG | HEIGHT: 57 IN | RESPIRATION RATE: 14 BRPM | DIASTOLIC BLOOD PRESSURE: 71 MMHG | BODY MASS INDEX: 23.88 KG/M2 | WEIGHT: 110.67 LBS | TEMPERATURE: 98.4 F

## 2023-03-05 DIAGNOSIS — R51.9 NONINTRACTABLE HEADACHE, UNSPECIFIED CHRONICITY PATTERN, UNSPECIFIED HEADACHE TYPE: Primary | ICD-10-CM

## 2023-03-05 PROCEDURE — 99283 EMERGENCY DEPT VISIT LOW MDM: CPT

## 2023-03-05 RX ORDER — BUTALBITAL, ACETAMINOPHEN AND CAFFEINE 50; 325; 40 MG/1; MG/1; MG/1
1 TABLET ORAL EVERY 4 HOURS PRN
Qty: 6 TABLET | Refills: 0 | Status: SHIPPED | OUTPATIENT
Start: 2023-03-05 | End: 2023-03-08

## 2023-03-05 ASSESSMENT — PAIN - FUNCTIONAL ASSESSMENT
PAIN_FUNCTIONAL_ASSESSMENT: 0-10
PAIN_FUNCTIONAL_ASSESSMENT: NONE - DENIES PAIN

## 2023-03-05 ASSESSMENT — PAIN SCALES - GENERAL
PAINLEVEL_OUTOF10: 4
PAINLEVEL_OUTOF10: 4

## 2023-03-05 ASSESSMENT — PAIN DESCRIPTION - LOCATION: LOCATION: HEAD

## 2023-03-05 ASSESSMENT — PAIN DESCRIPTION - DESCRIPTORS: DESCRIPTORS: ACHING

## 2023-03-05 ASSESSMENT — PAIN DESCRIPTION - FREQUENCY: FREQUENCY: INTERMITTENT

## 2023-03-05 ASSESSMENT — PAIN DESCRIPTION - PAIN TYPE: TYPE: ACUTE PAIN

## 2023-03-05 NOTE — Clinical Note
Tanmayarlyn Oziel was seen and treated in our emergency department on 3/5/2023. She may return to work on 03/07/2023. If you have any questions or concerns, please don't hesitate to call.       Alexis Heredia MD

## 2023-03-06 NOTE — ED PROVIDER NOTES
[facility] ENCOUNTER        Patient Name: Cindy Church  MRN: [id number]  [place] 2000  Date of evaluation: 3/5/2023  PCP: Jose Roberto Malagon MD  Note Started: 7:38 PM EST 3/5/23    CHIEF COMPLAINT       Headache (Bilateral back of head pain intermittent since yesterday. Denies any cold like symptoms but adds that she just got over a sore throat. Last taken motrin at 1430 30 cc. Denies any n/v. Denies any fevers. Denies sensitivity to light.)      HISTORY OF PRESENT ILLNESS: 1 or more Elements       Cindy Church is a 23 y.o. female who presents to the emergency department for evaluation of headache. Patient reports having bioccipital headache that started yesterday. Says she has been taking ibuprofen which has been somewhat helpful with symptoms. No injury to head. Reports having a sore throat, but says she is getting over a cold. Says she recently had a COVID and flu test.  Does not want any testing. No fevers. Denies having any neck pain. No anticoagulation. Rates her headache as a 4 out of 10 currently. At the worst, says the pain goes up to about 7 out of 10. No other complaints, modifying factors or associated symptoms. History obtained by the patient unless stated otherwise as above on HPI. No limitations unless specified as above on HPI. PMH, Surgical Hx, FH, Social Hx reviewed by myself. Patient's care impacted by the following conditions. History reviewed. No pertinent past medical history. Past Surgical History:   Procedure Laterality Date    APPENDECTOMY       History reviewed. No pertinent family history.   Social History     Socioeconomic History    Marital status: Single     Spouse name: Not on file    Number of children: Not on file    Years of education: Not on file    Highest education level: Not on file   Occupational History    Not on file   Tobacco Use    Smoking status: Never    Smokeless tobacco: Never Vaping Use    Vaping Use: Never used   Substance and Sexual Activity    Alcohol use: No    Drug use: No    Sexual activity: Never   Other Topics Concern    Not on file   Social History Narrative    Not on file     Social Determinants of Health     Financial Resource Strain: Low Risk     Difficulty of Paying Living Expenses: Not hard at all   Food Insecurity: No Food Insecurity    Worried About Running Out of Food in the Last Year: Never true    Ran Out of Food in the Last Year: Never true   Transportation Needs: Not on file   Physical Activity: Not on file   Stress: Not on file   Social Connections: Not on file   Intimate Partner Violence: Not on file   Housing Stability: Not on file     No current facility-administered medications for this encounter. Current Outpatient Medications   Medication Sig Dispense Refill    butalbital-acetaminophen-caffeine (FIORICET, ESGIC) -40 MG per tablet Take 1 tablet by mouth every 4 hours as needed for Headaches 6 tablet 0    clotrimazole-betamethasone (LOTRISONE) 1-0.05 % cream Apply topically 2 times daily. 45 g 0     No Known Allergies      REVIEW OF SYSTEMS :      Review of Systems  10 systems reviewed, pertinent positives per HPI otherwise noted to be negative. SCREENINGS        [place] Coma Scale  Eye Opening: Spontaneous  Best Verbal Response: Oriented  Best Motor Response: Obeys commands  [place] Coma Scale Score: 15                CIWA Assessment  BP: 112/71  Heart Rate: 94           PHYSICAL EXAM  1 or more Elements     ED Triage Vitals 03/05/23 1928   BP Temp Temp Source Heart Rate Resp SpO2 Height Weight   112/71 98.4 °F (36.9 °C) Oral 94 14 99 % 4' 9\" (1.448 m) 110 lb 10.7 oz (50.2 kg)       GENERAL APPEARANCE: Awake and alert. No acute distress. HENT: Normocephalic. Atraumatic. EOMI. No facial droop. No periorbital ecchymoses. No nuchal rigidity. LUNGS: Respirations unlabored. Speaking comfortably in full sentences.   EXTREMITIES: No gross deformities. Moving all extremities. SKIN: Warm and dry. No acute rashes. NEUROLOGICAL: Alert and oriented. No gross facial drooping. Answering questions appropriately. Moving all extremities. PSYCHIATRIC: Pleasant. Normal mood and affect. DIAGNOSTIC RESULTS   LABS:    Labs Reviewed - No data to display    When ordered only abnormal lab results are displayed. All other labs were within normal range or not returned as of this dictation. RADIOLOGY:   Non-plain film images such as CT, Ultrasound and MRI are read by the radiologist. Plain radiographic images are visualized and preliminarily interpreted by the ED Provider with the below findings:    N/A    Interpretation per the Radiologist below, if available at the time of this note:    No orders to display     No results found. Bedside Ultrasound, as interpreted by me, if performed:    No results found. PROCEDURES     Unless otherwise noted below, none     Procedures    CRITICAL CARE TIME     I personally spent a total of 0 minutes of critical care time in obtaining history, performing a physical exam, bedside monitoring of interventions, collecting and interpreting tests and discussion with consultants but excluding time spent performing procedures, treating other patients and teaching time. EMERGENCY DEPARTMENT COURSE and DIFFERENTIAL DIAGNOSIS/MDM:     Patient seen and evaluated. At presentation, patient was awake, alert, afebrile, hemodynamically stable, and satting well on room air. She was clinically nontoxic-appearing. Denies any injury to head. Reports her headache fluctuates between a 4 to a 7 out of 10. Given the description, low concern for subarachnoid hemorrhage. No fevers, no nuchal rigidity and therefore low concern for meningitis or encephalitis at this time. I offered to give Toradol. She declines.   She did not want COVID or influenza testing. She was agreeable with plan to follow-up with PCP and she was given a prescription of Fioricet. Discharge. CONSULTS: (Who and What was discussed)  None    Is this patient to be included in the SEP-1 Core Measure due to severe sepsis or septic shock? No   Exclusion criteria - the patient is NOT to be included for SEP-1 Core Measure due to:  2+ SIRS criteria are not met       During the patient's ED course, the patient was given:  Medications - No data to display         I am the Primary Clinician of Record. FINAL IMPRESSION      1. Nonintractable headache, unspecified chronicity pattern, unspecified headache type          DISPOSITION/PLAN     DISPOSITION Decision To Discharge 03/05/2023 07:38:29 PM      PATIENT REFERRED TO:  Clarisse Bahkta MD  [place] [id number] [phone]    In 2 days        DISCHARGE MEDICATIONS:  Patient was given scripts for the following medications. I counseled patient how to take these medications:  New Prescriptions    BUTALBITAL-ACETAMINOPHEN-CAFFEINE (FIORICET, ESGIC) -40 MG PER TABLET    Take 1 tablet by mouth every 4 hours as needed for Headaches       DISCONTINUED MEDICATIONS:  Discontinued Medications    No medications on file       Pt was seen during the COVID 19 pandemic. Appropriate PPE worn by ME during patient encounters. Patient was cared for during a time with constrained hospital bed capacity with nationwide stress on resources and staffing. (This chart was generated in part by using Dragon Dictation system and may contain errors related to that system including errors in grammar, punctuation, and spelling, as well as words and phrases that may be inappropriate.  If there are any questions or concerns please feel free to contact the dictating provider for clarification.)          Neida Burkitt, MD  03/05/23 2038

## 2023-03-06 NOTE — DISCHARGE INSTRUCTIONS
Please follow up with your PCP for further evaluation and treatment. You can try fioricet as needed for headache. If persistent or worsening symptoms, or if you have any concerns, return to ED immediately.

## 2023-03-13 ENCOUNTER — OFFICE VISIT (OUTPATIENT)
Dept: INTERNAL MEDICINE CLINIC | Age: 23
End: 2023-03-13

## 2023-03-13 VITALS
HEART RATE: 56 BPM | HEIGHT: 57 IN | SYSTOLIC BLOOD PRESSURE: 104 MMHG | RESPIRATION RATE: 17 BRPM | WEIGHT: 109.6 LBS | OXYGEN SATURATION: 95 % | DIASTOLIC BLOOD PRESSURE: 69 MMHG | BODY MASS INDEX: 23.64 KG/M2

## 2023-03-13 DIAGNOSIS — R10.30 LOWER ABDOMINAL PAIN: Primary | ICD-10-CM

## 2023-03-13 LAB
A/G RATIO: 1.6 (ref 1.1–2.2)
ALBUMIN SERPL-MCNC: 4.8 G/DL (ref 3.4–5)
ALP BLD-CCNC: 82 U/L (ref 40–129)
ALT SERPL-CCNC: 11 U/L (ref 10–40)
ANION GAP SERPL CALCULATED.3IONS-SCNC: 13 MMOL/L (ref 3–16)
AST SERPL-CCNC: 16 U/L (ref 15–37)
BASOPHILS ABSOLUTE: 0.1 K/UL (ref 0–0.2)
BASOPHILS RELATIVE PERCENT: 1 %
BILIRUB SERPL-MCNC: 0.6 MG/DL (ref 0–1)
BUN BLDV-MCNC: 11 MG/DL (ref 7–20)
CALCIUM SERPL-MCNC: 9.7 MG/DL (ref 8.3–10.6)
CHLORIDE BLD-SCNC: 106 MMOL/L (ref 99–110)
CO2: 21 MMOL/L (ref 21–32)
CREAT SERPL-MCNC: 0.6 MG/DL (ref 0.6–1.1)
EOSINOPHILS ABSOLUTE: 0.4 K/UL (ref 0–0.6)
EOSINOPHILS RELATIVE PERCENT: 4.4 %
GFR SERPL CREATININE-BSD FRML MDRD: >60 ML/MIN/{1.73_M2}
GLUCOSE BLD-MCNC: 83 MG/DL (ref 70–99)
HCT VFR BLD CALC: 38.2 % (ref 36–48)
HEMOGLOBIN: 12.9 G/DL (ref 12–16)
IGA: 232 MG/DL (ref 70–400)
LYMPHOCYTES ABSOLUTE: 2.5 K/UL (ref 1–5.1)
LYMPHOCYTES RELATIVE PERCENT: 25.1 %
MCH RBC QN AUTO: 30.6 PG (ref 26–34)
MCHC RBC AUTO-ENTMCNC: 33.8 G/DL (ref 31–36)
MCV RBC AUTO: 90.5 FL (ref 80–100)
MONOCYTES ABSOLUTE: 0.8 K/UL (ref 0–1.3)
MONOCYTES RELATIVE PERCENT: 8.1 %
NEUTROPHILS ABSOLUTE: 6.1 K/UL (ref 1.7–7.7)
NEUTROPHILS RELATIVE PERCENT: 61.4 %
PDW BLD-RTO: 12.8 % (ref 12.4–15.4)
PLATELET # BLD: 298 K/UL (ref 135–450)
PMV BLD AUTO: 9.7 FL (ref 5–10.5)
POTASSIUM SERPL-SCNC: 4.3 MMOL/L (ref 3.5–5.1)
RBC # BLD: 4.22 M/UL (ref 4–5.2)
SODIUM BLD-SCNC: 140 MMOL/L (ref 136–145)
TOTAL PROTEIN: 7.8 G/DL (ref 6.4–8.2)
WBC # BLD: 9.9 K/UL (ref 4–11)

## 2023-03-13 RX ORDER — PSYLLIUM HUSK/CALCIUM CARB 1 G-60 MG
1 CAPSULE ORAL
Qty: 90 CAPSULE | Refills: 3 | Status: SHIPPED | OUTPATIENT
Start: 2023-03-13

## 2023-03-13 RX ORDER — DICYCLOMINE HCL 20 MG
20 TABLET ORAL EVERY 6 HOURS PRN
Qty: 90 TABLET | Refills: 3 | Status: SHIPPED | OUTPATIENT
Start: 2023-03-13

## 2023-03-13 ASSESSMENT — PATIENT HEALTH QUESTIONNAIRE - PHQ9
SUM OF ALL RESPONSES TO PHQ9 QUESTIONS 1 & 2: 0
SUM OF ALL RESPONSES TO PHQ QUESTIONS 1-9: 0
1. LITTLE INTEREST OR PLEASURE IN DOING THINGS: 0
SUM OF ALL RESPONSES TO PHQ QUESTIONS 1-9: 0
2. FEELING DOWN, DEPRESSED OR HOPELESS: 0

## 2023-03-13 ASSESSMENT — ENCOUNTER SYMPTOMS
VOMITING: 0
BACK PAIN: 0
CHEST TIGHTNESS: 0
DIARRHEA: 1
CONSTIPATION: 1
COUGH: 0
ABDOMINAL PAIN: 1
SORE THROAT: 0
NAUSEA: 0

## 2023-03-13 NOTE — ASSESSMENT & PLAN NOTE
Suspect potentially irritable bowel syndrome.  -Trial Metamucil fiber supplementation every day for stool bulking.  -Low FODMAP diet handout given  -Dicyclomine as needed for abdominal cramping

## 2023-03-13 NOTE — PATIENT INSTRUCTIONS
Take metamucil fiber every day to keep bowel movements regular    Review low fodmap diet to see certain foods to avoid

## 2023-03-13 NOTE — PROGRESS NOTES
[facility]) Internal Medicine    Koki Longo is a 23 y.o. female with the past medical history as listed below presenting to the clinic for abdominal pain    Abdominal pain and constipation: 2 to 3 times a week gets constipated. Some diarrhea. Cramping in lower abdomen. Patient reports that these issues have been ongoing for the past year to 2 years. She reports symptoms started after the birth of her second son. She reports intermittent constipation. She said occasionally she will have diarrhea. She denies any significant anxiety or mental health disorder. She does have lower abdominal cramping. She reports constipation is 2-3 times a week. Reports constipation is more frequent than her diarrhea. She denies any blood with her stools. Review of Systems   Constitutional:  Negative for chills, fatigue and fever. HENT:  Negative for congestion, ear pain and sore throat. Respiratory:  Negative for cough and chest tightness. Cardiovascular:  Negative for chest pain, palpitations and leg swelling. Gastrointestinal:  Positive for abdominal pain, constipation and diarrhea. Negative for nausea and vomiting. Genitourinary:  Negative for dysuria, flank pain and urgency. Musculoskeletal:  Negative for arthralgias, back pain and joint swelling. Skin:  Negative for rash. Neurological:  Negative for dizziness, weakness and numbness. Psychiatric/Behavioral:  Negative for sleep disturbance. The patient is not nervous/anxious. History reviewed. No pertinent past medical history.     Past Surgical History:   Procedure Laterality Date    APPENDECTOMY         Social History     Socioeconomic History    Marital status: Single     Spouse name: Not on file    Number of children: Not on file    Years of education: Not on file    Highest education level: Not on file   Occupational History    Not on file   Tobacco Use    Smoking status: Never    Smokeless tobacco: Never   Vaping Use    Vaping Use: Never used   Substance and Sexual Activity    Alcohol use: No    Drug use: No    Sexual activity: Never   Other Topics Concern    Not on file   Social History Narrative    Not on file     Social Determinants of Health     Financial Resource Strain: Low Risk     Difficulty of Paying Living Expenses: Not hard at all   Food Insecurity: No Food Insecurity    Worried About Running Out of Food in the Last Year: Never true    Ran Out of Food in the Last Year: Never true   Transportation Needs: Not on file   Physical Activity: Not on file   Stress: Not on file   Social Connections: Not on file   Intimate Partner Violence: Not on file   Housing Stability: Not on file     No family history on file. Current Outpatient Medications:     Psyllium-Calcium (METAMUCIL PLUS CALCIUM) CAPS, Take 1 capsule by mouth daily (with breakfast) Take with 8 oz water., Disp: 90 capsule, Rfl: 3    dicyclomine (BENTYL) 20 MG tablet, Take 1 tablet by mouth every 6 hours as needed (Abdominal cramping), Disp: 90 tablet, Rfl: 3    clotrimazole-betamethasone (LOTRISONE) 1-0.05 % cream, Apply topically 2 times daily. , Disp: 45 g, Rfl: 0    butalbital-acetaminophen-caffeine (FIORICET, ESGIC) -40 MG per tablet, Take 1 tablet by mouth every 4 hours as needed for Headaches, Disp: 6 tablet, Rfl: 0     Examination  Vitals:    03/13/23 1409   BP: 104/69   Site: Left Upper Arm   Position: Sitting   Cuff Size: Medium Adult   Pulse: 56   Resp: 17   SpO2: 95%   Weight: 109 lb 9.6 oz (49.7 kg)   Height: 4' 9\" (1.448 m)      Physical Exam     Assessment and Plan  Lower abdominal pain   Suspect potentially irritable bowel syndrome.  -Trial Metamucil fiber supplementation every day for stool bulking.  -Low FODMAP diet handout given  -Dicyclomine as needed for abdominal cramping     There are no diagnoses linked to this encounter. Discussed use, benefit, and side effects of prescribed medications. Barriers to medication compliance addressed. Discussed all ordered testing and labs. All patient questions answered. Patient agreeable with plan above. Please note that this chart was generated using dragon dictation software. Although every effort was made to ensure the accuracy of this automated transcription, some errors in transcription may have occurred.

## 2023-03-14 ENCOUNTER — TELEPHONE (OUTPATIENT)
Dept: INTERNAL MEDICINE CLINIC | Age: 23
End: 2023-03-14

## 2023-03-14 LAB — TISSUE TRANSGLUTAMINASE IGA: <0.5 U/ML (ref 0–14)

## 2023-03-17 ENCOUNTER — TELEPHONE (OUTPATIENT)
Dept: INTERNAL MEDICINE CLINIC | Age: 23
End: 2023-03-17

## 2023-03-17 DIAGNOSIS — L30.9 PERIORBITAL DERMATITIS: ICD-10-CM

## 2023-03-17 DIAGNOSIS — T78.40XA ALLERGY, INITIAL ENCOUNTER: Primary | ICD-10-CM

## 2023-03-17 RX ORDER — CETIRIZINE HYDROCHLORIDE 10 MG/1
10 TABLET ORAL DAILY
Qty: 30 TABLET | Refills: 0 | Status: SHIPPED | OUTPATIENT
Start: 2023-03-17 | End: 2023-04-16

## 2023-03-17 RX ORDER — CLOTRIMAZOLE AND BETAMETHASONE DIPROPIONATE 10; .64 MG/G; MG/G
CREAM TOPICAL
Qty: 45 G | Refills: 0 | Status: SHIPPED | OUTPATIENT
Start: 2023-03-17

## 2023-06-06 ENCOUNTER — TELEPHONE (OUTPATIENT)
Dept: INTERNAL MEDICINE CLINIC | Age: 23
End: 2023-06-06

## 2023-06-06 ENCOUNTER — HOSPITAL ENCOUNTER (EMERGENCY)
Age: 23
Discharge: HOME | End: 2023-06-06
Attending: EMERGENCY MEDICINE

## 2023-06-06 VITALS
DIASTOLIC BLOOD PRESSURE: 66 MMHG | RESPIRATION RATE: 17 BRPM | BODY MASS INDEX: 22.8 KG/M2 | TEMPERATURE: 99.1 F | SYSTOLIC BLOOD PRESSURE: 107 MMHG | OXYGEN SATURATION: 99 % | WEIGHT: 105.38 LBS | HEART RATE: 79 BPM

## 2023-06-06 DIAGNOSIS — L24.9 IRRITANT CONTACT DERMATITIS, UNSPECIFIED TRIGGER: Primary | ICD-10-CM

## 2023-06-06 RX ORDER — HYDROXYZINE PAMOATE 25 MG/1
25-50 CAPSULE ORAL 3 TIMES DAILY PRN
Qty: 30 CAPSULE | Refills: 0 | Status: SHIPPED | OUTPATIENT
Start: 2023-06-06 | End: 2023-06-20

## 2023-06-06 RX ORDER — DIAPER,BRIEF,INFANT-TODD,DISP
EACH MISCELLANEOUS
Qty: 30 G | Refills: 1 | Status: SHIPPED | OUTPATIENT
Start: 2023-06-06 | End: 2023-06-13

## 2023-06-06 RX ORDER — PREDNISONE 20 MG/1
TABLET ORAL
Qty: 16 TABLET | Refills: 0 | Status: SHIPPED | OUTPATIENT
Start: 2023-06-06 | End: 2023-06-16

## 2023-06-06 ASSESSMENT — PAIN - FUNCTIONAL ASSESSMENT: PAIN_FUNCTIONAL_ASSESSMENT: NONE - DENIES PAIN

## 2023-06-06 NOTE — ED PROVIDER NOTES
[facility]  eMERGENCY dEPARTMENT eNCOUnter      Pt Name: Ladi Lin  MRN: [id number]  [place] 2000  Date of evaluation: 6/6/2023  Provider: Luz Maria Duran MD    [address] [place] [address]       Chief Complaint   Patient presents with    Rash     Face and arm started on Sunday, itches          CRITICAL CARE TIME   Total Critical Care time was 0 minutes, excluding separately reportable procedures. There was a high probability of clinically significant/life threatening deterioration in the patient's condition which required my urgent intervention. HISTORY OF PRESENT ILLNESS  (Location/Symptom, Timing/Onset, Context/Setting, Quality, Duration, Modifying Factors, Severity.)   History From: Patient  Limitations to history : None    Lali Joy is a 23 y.o. female who presents to the emergency department complaining of a pruritic rash on her face and arms that started on Sunday. Possible exposure outside. No new medications. No recent illness. No fever or chills. No sore throat. No body aches. She has been using clotrimazole cream over-the-counter and its not helping. Nursing Notes were reviewed and I agree. SCREENINGS        [place] Coma Scale  Eye Opening: Spontaneous  Best Verbal Response: Oriented  Best Motor Response: Obeys commands  [place] Coma Scale Score: 15                CIWA Assessment  BP: 107/66  Pulse: 80           REVIEW OF SYSTEMS    (2-9 systems for level 4, 10 or more for level 5)     HEENT: Facial rash. No rhinorrhea sore throat or earache. Respiratory: No cough or shortness of breath. GI: No abdominal pain, nausea, vomiting. Skin: Facial and arm rash, pruritic. Except as noted above the remainder of the review of systems was reviewed and negative. PAST MEDICAL HISTORY   History reviewed. No pertinent past medical history.       SURGICAL HISTORY       Past Surgical History:   Procedure Laterality Date

## 2023-06-06 NOTE — TELEPHONE ENCOUNTER
Face is broken out and her arms , rash or allergic reaction to medicine. Spreading and itching . Can't get into see specialist for 5 months. Allergies ?     Robles Ruffin   [phone]    Please call her back at [phone] iof any question

## 2023-06-06 NOTE — DISCHARGE INSTRUCTIONS
Take prednisone as prescribed until completed. Use hydroxyzine as needed for itching. Use hydrocortisone cream 2 times daily as needed for rash/itching. Follow-up in 5 to 7 days with your primary care physician if not improved. Return as needed for worsening of symptoms or new symptoms of concern.

## 2023-08-24 ENCOUNTER — APPOINTMENT (OUTPATIENT)
Dept: GENERAL RADIOLOGY | Age: 23
End: 2023-08-24
Attending: EMERGENCY MEDICINE

## 2023-08-24 ENCOUNTER — HOSPITAL ENCOUNTER (EMERGENCY)
Age: 23
Discharge: HOME | End: 2023-08-24
Attending: EMERGENCY MEDICINE

## 2023-08-24 VITALS
HEART RATE: 89 BPM | RESPIRATION RATE: 12 BRPM | SYSTOLIC BLOOD PRESSURE: 115 MMHG | WEIGHT: 110.23 LBS | BODY MASS INDEX: 23.78 KG/M2 | DIASTOLIC BLOOD PRESSURE: 58 MMHG | OXYGEN SATURATION: 100 % | TEMPERATURE: 98.3 F | HEIGHT: 57 IN

## 2023-08-24 DIAGNOSIS — R09.81 NASAL CONGESTION: Primary | ICD-10-CM

## 2023-08-24 LAB
EKG ATRIAL RATE: 78 BPM
EKG DIAGNOSIS: NORMAL
EKG P AXIS: 39 DEGREES
EKG P-R INTERVAL: 118 MS
EKG Q-T INTERVAL: 364 MS
EKG QRS DURATION: 76 MS
EKG QTC CALCULATION (BAZETT): 414 MS
EKG R AXIS: 77 DEGREES
EKG T AXIS: 38 DEGREES
EKG VENTRICULAR RATE: 78 BPM

## 2023-08-24 PROCEDURE — 93010 ELECTROCARDIOGRAM REPORT: CPT | Performed by: INTERNAL MEDICINE

## 2023-08-24 PROCEDURE — 71045 X-RAY EXAM CHEST 1 VIEW: CPT

## 2023-08-24 PROCEDURE — 6370000000 HC RX 637 (ALT 250 FOR IP): Performed by: EMERGENCY MEDICINE

## 2023-08-24 PROCEDURE — 93005 ELECTROCARDIOGRAM TRACING: CPT | Performed by: EMERGENCY MEDICINE

## 2023-08-24 PROCEDURE — 99284 EMERGENCY DEPT VISIT MOD MDM: CPT

## 2023-08-24 RX ORDER — CETIRIZINE HYDROCHLORIDE 10 MG/1
10 TABLET ORAL DAILY
Qty: 30 TABLET | Refills: 0 | Status: SHIPPED | OUTPATIENT
Start: 2023-08-24

## 2023-08-24 RX ORDER — ACETAMINOPHEN 325 MG/1
650 TABLET ORAL ONCE
Status: COMPLETED | OUTPATIENT
Start: 2023-08-24 | End: 2023-08-24

## 2023-08-24 RX ORDER — FAMOTIDINE 20 MG/1
20 TABLET, FILM COATED ORAL ONCE
Status: COMPLETED | OUTPATIENT
Start: 2023-08-24 | End: 2023-08-24

## 2023-08-24 RX ADMIN — FAMOTIDINE 20 MG: 20 TABLET, FILM COATED ORAL at 02:58

## 2023-08-24 RX ADMIN — ACETAMINOPHEN 650 MG: 325 TABLET ORAL at 02:58

## 2023-08-24 ASSESSMENT — PAIN - FUNCTIONAL ASSESSMENT
PAIN_FUNCTIONAL_ASSESSMENT: 0-10
PAIN_FUNCTIONAL_ASSESSMENT: NONE - DENIES PAIN

## 2023-08-24 ASSESSMENT — PAIN SCALES - GENERAL: PAINLEVEL_OUTOF10: 2

## 2023-08-24 NOTE — ED NOTES
Patient ambulatory from ED. AVS provided and discussed with patient. All questions answered. Patient verbalizes understanding of discharge instructions. Respirations even and easy. No obvious distress at this time.       Radha Dubois, [facility]  08/24/23 9573

## 2023-08-24 NOTE — ED NOTES
Provided pt with warm blanket and updated pt on plan of care. No further questions at this time.      Chanelle Williamson, [address]  08/24/23 9641

## 2023-08-24 NOTE — ED TRIAGE NOTES
Pt c/o of nasal congestion and itchy sore throat for two days. Pt states her two kids were sick at home earlier this week. Pt denies n/v/d and fever. Pt states she has intermittent chest pain when taking a deep breath rated 2/10.  No meds prior to arrival.

## 2023-08-29 NOTE — ED PROVIDER NOTES
[facility] ENCOUNTER        Pt Name: Elijah Graham  MRN: [id number]  [facility] 2000  Date of evaluation: 8/24/2023  Provider: Radha Ashford MD  PCP: Aaron Nazario MD  Note Started: 1:06 AM EDT 8/29/23    CHIEF COMPLAINT       Chief Complaint   Patient presents with    Nasal Congestion     Pt c/o of nasal congestion and itchy sore throat for two days. Pt states her two kids were sick at home earlier this week. Pt denies n/v/d and fever. Pt states she has intermittent chest pain when taking a deep breath rated 2/10. No meds prior to arrival.        HISTORY OF PRESENT ILLNESS: 1 or more Elements   History From: Patient        Elijah Graham is a 26 y.o. female who presents for evaluation of nasal congestion and a sensation of itching in her throat. Patient reports that both her children had similar symptoms at home. She reports 2-day history of symptoms that are progressive worsening. She reports having difficulty breathing because of the nasal congestion. She has fevers rash nausea or vomiting. She has not taken medications for her symptoms. Reports intermittent chest wall pain with deep breaths but currently denies any chest pain. Nursing Notes were all reviewed and agreed with or any disagreements were addressed in the HPI. REVIEW OF SYSTEMS :      Review of Systems    Positives and Pertinent negatives as per HPI. SURGICAL HISTORY     Past Surgical History:   Procedure Laterality Date    APPENDECTOMY         CURRENTMEDICATIONS       Discharge Medication List as of 8/24/2023  3:34 AM        CONTINUE these medications which have NOT CHANGED    Details   clotrimazole-betamethasone (LOTRISONE) 1-0.05 % cream Apply topically 2 times daily. , Disp-45 g, R-0, Normal      Psyllium-Calcium (METAMUCIL PLUS CALCIUM) CAPS Take 1 capsule by mouth daily (with breakfast) Take with 8 oz water., Disp-90 capsule, R-3Normal      dicyclomine (BENTYL)

## 2023-10-10 ENCOUNTER — OFFICE VISIT (OUTPATIENT)
Dept: INTERNAL MEDICINE CLINIC | Age: 23
End: 2023-10-10
Payer: COMMERCIAL

## 2023-10-10 VITALS
WEIGHT: 105 LBS | BODY MASS INDEX: 22.65 KG/M2 | DIASTOLIC BLOOD PRESSURE: 62 MMHG | HEART RATE: 75 BPM | HEIGHT: 57 IN | SYSTOLIC BLOOD PRESSURE: 94 MMHG

## 2023-10-10 DIAGNOSIS — L67.9 HAIR CHANGES: ICD-10-CM

## 2023-10-10 DIAGNOSIS — L65.9 HAIR LOSS: Primary | ICD-10-CM

## 2023-10-10 PROCEDURE — 99214 OFFICE O/P EST MOD 30 MIN: CPT | Performed by: STUDENT IN AN ORGANIZED HEALTH CARE EDUCATION/TRAINING PROGRAM

## 2023-10-10 PROCEDURE — G8484 FLU IMMUNIZE NO ADMIN: HCPCS | Performed by: STUDENT IN AN ORGANIZED HEALTH CARE EDUCATION/TRAINING PROGRAM

## 2023-10-10 PROCEDURE — 36415 COLL VENOUS BLD VENIPUNCTURE: CPT | Performed by: STUDENT IN AN ORGANIZED HEALTH CARE EDUCATION/TRAINING PROGRAM

## 2023-10-10 PROCEDURE — G8420 CALC BMI NORM PARAMETERS: HCPCS | Performed by: STUDENT IN AN ORGANIZED HEALTH CARE EDUCATION/TRAINING PROGRAM

## 2023-10-10 PROCEDURE — 1036F TOBACCO NON-USER: CPT | Performed by: STUDENT IN AN ORGANIZED HEALTH CARE EDUCATION/TRAINING PROGRAM

## 2023-10-10 PROCEDURE — G8427 DOCREV CUR MEDS BY ELIG CLIN: HCPCS | Performed by: STUDENT IN AN ORGANIZED HEALTH CARE EDUCATION/TRAINING PROGRAM

## 2023-10-10 ASSESSMENT — ENCOUNTER SYMPTOMS
EYES NEGATIVE: 1
ALLERGIC/IMMUNOLOGIC NEGATIVE: 1
SHORTNESS OF BREATH: 0
EYE DISCHARGE: 0
ABDOMINAL PAIN: 0
RESPIRATORY NEGATIVE: 1
GASTROINTESTINAL NEGATIVE: 1

## 2023-10-10 NOTE — PROGRESS NOTES
Lali Ta (:  2000) is a 26 y.o. female,Established patient, here for evaluation of the following chief complaint(s):  Hair/Scalp Problem (Recent hair loss, x 3months. No other symptoms. )      Patient reports that she has been using Depo shot for 2 years, since the last 3 months she has been losing hair. Its thinning and there has been no break as of here. She stopped her Depo-Provera considering it could be the cause. ASSESSMENT/PLAN:  1. Hair loss-without any scalp lesions noted, labs ordered for further evaluation. Encouraged to take biotin.  -     TSH with Reflex; Future  -     Vitamin D 25 Hydroxy  -     Comprehensive Metabolic Panel; Future  2. Hair changes-break of hair, additional labs done. Encouraged to take biotin.  -     TSH with Reflex; Future  -     Vitamin D 25 Hydroxy  -     Comprehensive Metabolic Panel; Future      Return if symptoms worsen or fail to improve. Subjective   SUBJECTIVE/OBJECTIVE:  HPI    Review of Systems   Constitutional: Negative. Negative for chills and fever. HENT: Negative. Eyes: Negative. Negative for discharge. Respiratory: Negative. Negative for shortness of breath. Cardiovascular: Negative. Negative for chest pain and palpitations. Gastrointestinal: Negative. Negative for abdominal pain. Endocrine: Negative. Genitourinary: Negative. Musculoskeletal: Negative. Skin: Negative. Allergic/Immunologic: Negative. Neurological: Negative. Negative for dizziness and headaches. Hematological: Negative. Psychiatric/Behavioral: Negative. Objective   Physical Exam  Vitals reviewed. Constitutional:       Appearance: Normal appearance. HENT:      Head: Normocephalic. Eyes:      Extraocular Movements: Extraocular movements intact. Pupils: Pupils are equal, round, and reactive to light. Cardiovascular:      Rate and Rhythm: Normal rate. Heart sounds: Normal heart sounds.  No murmur

## 2023-10-11 ENCOUNTER — TELEPHONE (OUTPATIENT)
Dept: INTERNAL MEDICINE CLINIC | Age: 23
End: 2023-10-11

## 2023-10-11 DIAGNOSIS — E55.9 VITAMIN D DEFICIENCY: Primary | ICD-10-CM

## 2023-10-11 LAB
25(OH)D3 SERPL-MCNC: 23.9 NG/ML
ALBUMIN SERPL-MCNC: 4.7 G/DL (ref 3.4–5)
ALBUMIN/GLOB SERPL: 1.8 {RATIO} (ref 1.1–2.2)
ALP SERPL-CCNC: 87 U/L (ref 40–129)
ALT SERPL-CCNC: 8 U/L (ref 10–40)
ANION GAP SERPL CALCULATED.3IONS-SCNC: 11 MMOL/L (ref 3–16)
AST SERPL-CCNC: 12 U/L (ref 15–37)
BILIRUB SERPL-MCNC: 0.4 MG/DL (ref 0–1)
BUN SERPL-MCNC: 9 MG/DL (ref 7–20)
CALCIUM SERPL-MCNC: 9.1 MG/DL (ref 8.3–10.6)
CHLORIDE SERPL-SCNC: 109 MMOL/L (ref 99–110)
CO2 SERPL-SCNC: 22 MMOL/L (ref 21–32)
CREAT SERPL-MCNC: 0.7 MG/DL (ref 0.6–1.1)
GFR SERPLBLD CREATININE-BSD FMLA CKD-EPI: >60 ML/MIN/{1.73_M2}
GLUCOSE SERPL-MCNC: 91 MG/DL (ref 70–99)
POTASSIUM SERPL-SCNC: 3.8 MMOL/L (ref 3.5–5.1)
PROT SERPL-MCNC: 7.3 G/DL (ref 6.4–8.2)
SODIUM SERPL-SCNC: 142 MMOL/L (ref 136–145)
TSH SERPL DL<=0.005 MIU/L-ACNC: 0.6 UIU/ML (ref 0.27–4.2)

## 2023-10-11 RX ORDER — ERGOCALCIFEROL 1.25 MG/1
50000 CAPSULE ORAL WEEKLY
Qty: 12 CAPSULE | Refills: 0 | Status: SHIPPED | OUTPATIENT
Start: 2023-10-11

## 2023-10-11 NOTE — TELEPHONE ENCOUNTER
She want to know if there is liquid form for this, Vitamin D , she has a hard time swallowing pills  ,     [facility] [phone]

## 2023-10-12 NOTE — TELEPHONE ENCOUNTER
She can get over the counter gummies or pediatrics vitmain D (liquid) - dose 1000 U daily    Patient has been informed of the above

## 2023-10-21 ENCOUNTER — HOSPITAL ENCOUNTER (EMERGENCY)
Age: 23
Discharge: HOME | End: 2023-10-21
Attending: STUDENT IN AN ORGANIZED HEALTH CARE EDUCATION/TRAINING PROGRAM

## 2023-10-21 VITALS
TEMPERATURE: 98.1 F | HEART RATE: 96 BPM | BODY MASS INDEX: 23.45 KG/M2 | HEIGHT: 57 IN | DIASTOLIC BLOOD PRESSURE: 73 MMHG | SYSTOLIC BLOOD PRESSURE: 117 MMHG | WEIGHT: 108.69 LBS | OXYGEN SATURATION: 100 % | RESPIRATION RATE: 14 BRPM

## 2023-10-21 DIAGNOSIS — H60.392 INFECTIVE OTITIS EXTERNA OF LEFT EAR: Primary | ICD-10-CM

## 2023-10-21 PROCEDURE — 99283 EMERGENCY DEPT VISIT LOW MDM: CPT

## 2023-10-21 RX ORDER — CIPROFLOXACIN 500 MG/1
500 TABLET, FILM COATED ORAL 2 TIMES DAILY
Qty: 14 TABLET | Refills: 0 | Status: SHIPPED | OUTPATIENT
Start: 2023-10-21 | End: 2023-10-28

## 2023-10-21 ASSESSMENT — PAIN SCALES - GENERAL: PAINLEVEL_OUTOF10: 6

## 2023-10-21 ASSESSMENT — PAIN DESCRIPTION - DESCRIPTORS: DESCRIPTORS: THROBBING

## 2023-10-21 ASSESSMENT — PAIN DESCRIPTION - ORIENTATION: ORIENTATION: LEFT

## 2023-10-21 ASSESSMENT — PAIN DESCRIPTION - LOCATION: LOCATION: EAR

## 2023-10-21 ASSESSMENT — PAIN - FUNCTIONAL ASSESSMENT: PAIN_FUNCTIONAL_ASSESSMENT: 0-10

## 2023-10-21 NOTE — ED PROVIDER NOTES
[facility] ENCOUNTER        Pt Name: Pinky Lakhani  MRN: [id number]  [address] [facility] 2000  Date of evaluation: 10/21/2023  Provider: Rene Reed MD  PCP: Cecy Schreiber MD  Note Started: 6:19 AM EDT 10/21/23    CHIEF COMPLAINT       Chief Complaint   Patient presents with    Otalgia     Pt c/o of L outer ear pain since last Tuesday rated 6/10. Pt denies injury. Pt last taken ibuprofen at midnight with minimal relief. Pt denies n/v/d and fever. HISTORY OF PRESENT ILLNESS: 1 or more Elements     History from : Patient    Limitations to history : None    Lali Wan is a 26 y.o. female who presents with L outer ear pain x several days. 6/10, states wearing ear protection at work but hurts when she touches her ear. No fevers, + nasal congestion. No headache. Denies ear drainage, trauma or new piercings. Nursing Notes were all reviewed and agreed with or any disagreements were addressed in the HPI. REVIEW OF SYSTEMS :      Positives and Pertinent negatives as per HPI. ROS otherwise unremarkable. SURGICAL HISTORY     Past Surgical History:   Procedure Laterality Date    APPENDECTOMY         CURRENTMEDICATIONS       Discharge Medication List as of 10/21/2023  3:41 AM        CONTINUE these medications which have NOT CHANGED    Details   vitamin D (ERGOCALCIFEROL) 1.25 MG (57896 UT) CAPS capsule Take 1 capsule by mouth once a week, Disp-12 capsule, R-0Normal      cetirizine (ZYRTEC) 10 MG tablet Take 1 tablet by mouth daily, Disp-30 tablet, R-0Normal      clotrimazole-betamethasone (LOTRISONE) 1-0.05 % cream Apply topically 2 times daily. , Disp-45 g, R-0, Normal      Psyllium-Calcium (METAMUCIL PLUS CALCIUM) CAPS Take 1 capsule by mouth daily (with breakfast) Take with 8 oz water., Disp-90 capsule, R-3Normal      dicyclomine (BENTYL) 20 MG tablet Take 1 tablet by mouth every 6 hours as needed (Abdominal cramping), Disp-90 tablet, Oral   SpO2: 100%   Weight: 49.3 kg (108 lb 11 oz)   Height: 1.448 m (4' 9\")       Patient was given the following medications:            Is this patient to be included in the SEP-1 Core Measure due to severe sepsis or septic shock? No   Exclusion criteria - the patient is NOT to be included for SEP-1 Core Measure due to:  2+ SIRS criteria are not met    Chronic Conditions: none pertinent    CONSULTS: (Who and What was discussed)  None                CC/HPI Summary, DDx, ED Course, and Reassessment: 27 yo F with L ear pain, localized to the L outer ear and canal, has tenderness to otoscopy, is not immunocompromised, has mild auricular erythema without e/o edema or auricular perichondritis on exam.  More likely otitis externa and given the mild auricular cellulitis, will tx with PO cipro as opposed to strictly topical tx. Gave pt discharge instructions, return precautions, PCP follow up. Pt voiced understanding. D/c home, departed ambulatory in stable condition. Disposition Considerations (tests considered but not done, Shared Decision Making, Pt Expectation of Test or Tx.):   Appropriate for outpatient management        I am the Primary Clinician of Record. FINAL IMPRESSION      1.  Infective otitis externa of left ear          DISPOSITION/PLAN     DISPOSITION Decision To Discharge 10/21/2023 03:38:22 AM      PATIENT REFERRED TO:  Patricia Lara MD  [address] [place] [address]    Schedule an appointment as soon as possible for a visit       [address]  [phone]    If symptoms worsen      DISCHARGE MEDICATIONS:  Discharge Medication List as of 10/21/2023  3:41 AM        START taking these medications    Details   ciprofloxacin (CIPRO) 500 MG tablet Take 1 tablet by mouth 2 times daily for 7 days, Disp-14 tablet, R-0Normal             DISCONTINUED MEDICATIONS:  Discharge Medication List as of

## 2023-10-21 NOTE — ED NOTES
Patient ambulatory from ED. AVS provided and discussed with patient. All questions answered. Patient verbalizes understanding of discharge instructions. Respirations even and easy. No obvious distress at this time.       Scarlet Stahl  10/21/23 9229

## 2023-10-21 NOTE — ED TRIAGE NOTES
Pt c/o of L outer ear pain since last Tuesday rated 6/10. Pt denies injury. Pt last taken ibuprofen at midnight with minimal relief. Pt denies n/v/d and fever.

## 2023-11-06 ENCOUNTER — APPOINTMENT (OUTPATIENT)
Dept: GENERAL RADIOLOGY | Age: 23
End: 2023-11-06

## 2023-11-06 ENCOUNTER — HOSPITAL ENCOUNTER (EMERGENCY)
Age: 23
Discharge: HOME | End: 2023-11-06
Attending: STUDENT IN AN ORGANIZED HEALTH CARE EDUCATION/TRAINING PROGRAM

## 2023-11-06 VITALS
RESPIRATION RATE: 16 BRPM | TEMPERATURE: 98.1 F | OXYGEN SATURATION: 100 % | SYSTOLIC BLOOD PRESSURE: 121 MMHG | WEIGHT: 102.73 LBS | HEART RATE: 88 BPM | DIASTOLIC BLOOD PRESSURE: 66 MMHG | BODY MASS INDEX: 22.16 KG/M2 | HEIGHT: 57 IN

## 2023-11-06 DIAGNOSIS — M25.532 LEFT WRIST PAIN: Primary | ICD-10-CM

## 2023-11-06 PROCEDURE — 99283 EMERGENCY DEPT VISIT LOW MDM: CPT

## 2023-11-06 PROCEDURE — 73110 X-RAY EXAM OF WRIST: CPT

## 2023-11-06 PROCEDURE — 73130 X-RAY EXAM OF HAND: CPT

## 2023-11-06 RX ORDER — IBUPROFEN 800 MG/1
800 TABLET ORAL
Status: DISCONTINUED | OUTPATIENT
Start: 2023-11-06 | End: 2023-11-06 | Stop reason: HOSPADM

## 2023-11-06 RX ORDER — ACETAMINOPHEN 325 MG/1
650 TABLET ORAL EVERY 6 HOURS PRN
Qty: 30 TABLET | Refills: 0 | Status: SHIPPED | OUTPATIENT
Start: 2023-11-06

## 2023-11-06 RX ORDER — ACETAMINOPHEN 500 MG
1000 TABLET ORAL
Status: DISCONTINUED | OUTPATIENT
Start: 2023-11-06 | End: 2023-11-06 | Stop reason: HOSPADM

## 2023-11-06 RX ORDER — IBUPROFEN 600 MG/1
600 TABLET ORAL EVERY 6 HOURS PRN
Qty: 30 TABLET | Refills: 0 | Status: SHIPPED | OUTPATIENT
Start: 2023-11-06

## 2023-11-06 ASSESSMENT — PAIN DESCRIPTION - LOCATION: LOCATION: WRIST

## 2023-11-06 ASSESSMENT — PAIN SCALES - GENERAL: PAINLEVEL_OUTOF10: 7

## 2023-11-06 ASSESSMENT — PAIN DESCRIPTION - PAIN TYPE: TYPE: ACUTE PAIN

## 2023-11-06 ASSESSMENT — PAIN DESCRIPTION - ORIENTATION: ORIENTATION: LEFT

## 2023-11-06 ASSESSMENT — PAIN DESCRIPTION - DESCRIPTORS: DESCRIPTORS: SORE

## 2023-11-06 NOTE — ED PROVIDER NOTES
[facility]    CHIEF COMPLAINT  Wrist Pain (Pain left wrist at 7 which started today. No known injury. No OTC pain meds taken. No swelling noted. Painful ROM. Palpable left radial/ulnar pulses. Natural skin color and sensation.)       HISTORY OF PRESENT ILLNESS  Jessica Flores is a 26 y.o. female presenting to the ED for left wrist pain. Patient states she developed left wrist pain yesterday while she was at work. Patient states at work she does heavy lifting. Patient states today she woke up with pain that was significantly worse localized over the ulnar region of left wrist.  Patient denies any trauma or falls. Patient denies any fevers or past medical history. Patient states she has pain with extension and flexion of wrist.    - History obtained from: Patient  - Limitations to history: None    I have reviewed the following from the nursing documentation:    No past medical history on file. Past Surgical History:   Procedure Laterality Date    APPENDECTOMY       No family history on file.   Social History     Socioeconomic History    Marital status: Single     Spouse name: Not on file    Number of children: Not on file    Years of education: Not on file    Highest education level: Not on file   Occupational History    Not on file   Tobacco Use    Smoking status: Never    Smokeless tobacco: Never   Vaping Use    Vaping Use: Never used   Substance and Sexual Activity    Alcohol use: No    Drug use: No    Sexual activity: Never   Other Topics Concern    Not on file   Social History Narrative    Not on file     Social Determinants of Health     Financial Resource Strain: Low Risk  (1/10/2023)    Overall Financial Resource Strain (CARDIA)     Difficulty of Paying Living Expenses: Not hard at all   Food Insecurity: No Food Insecurity (1/10/2023)    Hunger Vital Sign     Worried About Running Out of Food in the Last Year: Never true     [address] in the Last Year:

## 2023-11-06 NOTE — ED NOTES
Pain left wrist at 7 which started today. No known injury. No OTC pain meds taken. No swelling noted. Painful ROM. Palpable left radial/ulnar pulses. Natural skin color and sensation.      Lauro Alvarado RN  11/06/23 4398

## 2023-11-09 NOTE — ED NOTES
Discharge instructions with pt. Explained rx's. Encouraged follow up with PCP and return to ED as needed. Wrist brace fitted for pt with teaching for home use. Left wrist pain at 7.      Litzy Trotter RN  11/08/23 2051

## 2023-11-09 NOTE — ED NOTES
Portable xrays being done. Pain left wrist at 7.   Already examined by Licha Eller, ARACELI  11/08/23 5562

## 2023-12-14 ENCOUNTER — HOSPITAL ENCOUNTER (EMERGENCY)
Age: 23
Discharge: HOME | End: 2023-12-14
Attending: EMERGENCY MEDICINE

## 2023-12-14 VITALS
SYSTOLIC BLOOD PRESSURE: 116 MMHG | TEMPERATURE: 98.4 F | WEIGHT: 105.6 LBS | BODY MASS INDEX: 22.85 KG/M2 | OXYGEN SATURATION: 100 % | HEART RATE: 86 BPM | DIASTOLIC BLOOD PRESSURE: 67 MMHG | RESPIRATION RATE: 16 BRPM

## 2023-12-14 DIAGNOSIS — H65.91 MIDDLE EAR EFFUSION, RIGHT: ICD-10-CM

## 2023-12-14 DIAGNOSIS — H92.01 RIGHT EAR PAIN: Primary | ICD-10-CM

## 2023-12-14 PROCEDURE — 6370000000 HC RX 637 (ALT 250 FOR IP): Performed by: EMERGENCY MEDICINE

## 2023-12-14 RX ORDER — NAPROXEN 250 MG/1
250 TABLET ORAL ONCE
Status: COMPLETED | OUTPATIENT
Start: 2023-12-14 | End: 2023-12-14

## 2023-12-14 RX ORDER — TRIAMCINOLONE ACETONIDE 55 UG/1
2 SPRAY, METERED NASAL DAILY
Qty: 1 EACH | Refills: 0 | Status: SHIPPED | OUTPATIENT
Start: 2023-12-14 | End: 2023-12-28

## 2023-12-14 RX ORDER — CETIRIZINE HYDROCHLORIDE 10 MG/1
10 TABLET ORAL DAILY
Qty: 7 TABLET | Refills: 0 | Status: SHIPPED | OUTPATIENT
Start: 2023-12-14 | End: 2023-12-21

## 2023-12-14 RX ORDER — CETIRIZINE HYDROCHLORIDE 10 MG/1
10 TABLET ORAL ONCE
Status: COMPLETED | OUTPATIENT
Start: 2023-12-14 | End: 2023-12-14

## 2023-12-14 RX ORDER — NAPROXEN 250 MG/1
250 TABLET ORAL EVERY 12 HOURS PRN
Qty: 14 TABLET | Refills: 0 | Status: SHIPPED | OUTPATIENT
Start: 2023-12-14

## 2023-12-14 RX ADMIN — NAPROXEN SODIUM 250 MG: 250 TABLET ORAL at 05:46

## 2023-12-14 RX ADMIN — CETIRIZINE HYDROCHLORIDE 10 MG: 10 TABLET, FILM COATED ORAL at 05:46

## 2023-12-14 ASSESSMENT — PAIN - FUNCTIONAL ASSESSMENT: PAIN_FUNCTIONAL_ASSESSMENT: 0-10

## 2023-12-14 ASSESSMENT — PAIN SCALES - GENERAL: PAINLEVEL_OUTOF10: 4

## 2023-12-14 ASSESSMENT — PAIN DESCRIPTION - FREQUENCY: FREQUENCY: CONTINUOUS

## 2023-12-14 ASSESSMENT — PAIN DESCRIPTION - LOCATION: LOCATION: JAW

## 2023-12-14 ASSESSMENT — PAIN DESCRIPTION - PAIN TYPE: TYPE: ACUTE PAIN

## 2023-12-14 ASSESSMENT — PAIN DESCRIPTION - ORIENTATION: ORIENTATION: RIGHT

## 2023-12-14 NOTE — DISCHARGE INSTRUCTIONS
Your prescription has been sent to [facility]. It's also OK to use acetaminophen (Tylenol) as needed to help with your pain. Be sure to contact your primary care provider's office to arrange for a follow up visit. If you have any new or worsening issues after going home don't hesitate to return here for reevaluation at any time 24/7!

## 2024-02-07 ENCOUNTER — HOSPITAL ENCOUNTER (EMERGENCY)
Age: 24
Discharge: HOME | End: 2024-02-07
Attending: EMERGENCY MEDICINE

## 2024-02-07 VITALS
SYSTOLIC BLOOD PRESSURE: 109 MMHG | RESPIRATION RATE: 18 BRPM | HEIGHT: 57 IN | OXYGEN SATURATION: 99 % | DIASTOLIC BLOOD PRESSURE: 69 MMHG | HEART RATE: 103 BPM | WEIGHT: 100.97 LBS | BODY MASS INDEX: 21.78 KG/M2 | TEMPERATURE: 98.7 F

## 2024-02-07 DIAGNOSIS — J02.0 STREPTOCOCCAL SORE THROAT: Primary | ICD-10-CM

## 2024-02-07 DIAGNOSIS — U07.1 COVID-19: ICD-10-CM

## 2024-02-07 LAB
FLUAV RNA UPPER RESP QL NAA+PROBE: NEGATIVE
FLUBV AG NPH QL: NEGATIVE
S PYO AG THROAT QL: POSITIVE
SARS-COV-2 RDRP RESP QL NAA+PROBE: DETECTED

## 2024-02-07 PROCEDURE — 87880 STREP A ASSAY W/OPTIC: CPT

## 2024-02-07 PROCEDURE — 6370000000 HC RX 637 (ALT 250 FOR IP): Performed by: EMERGENCY MEDICINE

## 2024-02-07 PROCEDURE — 99283 EMERGENCY DEPT VISIT LOW MDM: CPT

## 2024-02-07 PROCEDURE — 6360000002 HC RX W HCPCS

## 2024-02-07 PROCEDURE — 6370000000 HC RX 637 (ALT 250 FOR IP)

## 2024-02-07 PROCEDURE — 87635 SARS-COV-2 COVID-19 AMP PRB: CPT

## 2024-02-07 PROCEDURE — 87804 INFLUENZA ASSAY W/OPTIC: CPT

## 2024-02-07 RX ORDER — ACETAMINOPHEN 500 MG
1000 TABLET ORAL ONCE
Status: COMPLETED | OUTPATIENT
Start: 2024-02-07 | End: 2024-02-07

## 2024-02-07 RX ORDER — DEXAMETHASONE 4 MG/1
TABLET ORAL
Status: COMPLETED
Start: 2024-02-07 | End: 2024-02-07

## 2024-02-07 RX ORDER — ACETAMINOPHEN 500 MG
TABLET ORAL
Status: COMPLETED
Start: 2024-02-07 | End: 2024-02-07

## 2024-02-07 RX ORDER — IBUPROFEN 600 MG/1
600 TABLET ORAL EVERY 6 HOURS PRN
Qty: 40 TABLET | Refills: 0 | Status: SHIPPED | OUTPATIENT
Start: 2024-02-07

## 2024-02-07 RX ORDER — AMOXICILLIN 250 MG/1
500 CAPSULE ORAL ONCE
Status: COMPLETED | OUTPATIENT
Start: 2024-02-07 | End: 2024-02-07

## 2024-02-07 RX ORDER — DEXAMETHASONE 4 MG/1
4 TABLET ORAL ONCE
Status: COMPLETED | OUTPATIENT
Start: 2024-02-07 | End: 2024-02-07

## 2024-02-07 RX ORDER — AMOXICILLIN 500 MG/1
500 CAPSULE ORAL 2 TIMES DAILY
Qty: 20 CAPSULE | Refills: 0 | Status: SHIPPED | OUTPATIENT
Start: 2024-02-07 | End: 2024-02-17

## 2024-02-07 RX ADMIN — Medication 1000 MG: at 09:38

## 2024-02-07 RX ADMIN — ACETAMINOPHEN 1000 MG: 500 TABLET ORAL at 09:38

## 2024-02-07 RX ADMIN — AMOXICILLIN 500 MG: 250 CAPSULE ORAL at 10:55

## 2024-02-07 RX ADMIN — DEXAMETHASONE 4 MG: 4 TABLET ORAL at 09:38

## 2024-02-07 ASSESSMENT — PAIN SCALES - GENERAL
PAINLEVEL_OUTOF10: 2
PAINLEVEL_OUTOF10: 2
PAINLEVEL_OUTOF10: 4
PAINLEVEL_OUTOF10: 4

## 2024-02-07 ASSESSMENT — PAIN DESCRIPTION - LOCATION: LOCATION: THROAT;HEAD

## 2024-02-07 ASSESSMENT — PAIN DESCRIPTION - PAIN TYPE: TYPE: ACUTE PAIN

## 2024-02-07 ASSESSMENT — PAIN DESCRIPTION - DESCRIPTORS: DESCRIPTORS: ACHING;SORE

## 2024-02-07 ASSESSMENT — PAIN - FUNCTIONAL ASSESSMENT: PAIN_FUNCTIONAL_ASSESSMENT: 0-10

## 2024-02-07 NOTE — DISCHARGE INSTRUCTIONS
Call today or tomorrow to follow up with Radha Lynne MD  in 4-5 days.    Mix 1 teaspoon of maalox and 1 teaspoon of liquid benadryl, gargle for 1 minute then swallow.  You can also use Cepacol lozenge or Chloraseptic throat spray to help soothe your throat.    If you were diagnosed with strep throat, make sure that you throw your toothbrush away.    Return to the Emergency Department for fever > 101.5, difficulty with swallowing foods or liquids, excessive nausea or vomiting, difficulty with breathing, any other care or concern.

## 2024-02-07 NOTE — ED NOTES
EMD discussed test results.   Pulse 105.   Has only had sips of water (encouraged 3-4 glasses of water already).  EMD updated and pt drinking more water.

## 2024-02-07 NOTE — ED NOTES
Discharge instructions with pt.  Explained rx's.  Strep and covid teaching done.   Covid quarantine teaching done.   HA and sore throat at 2.  Body aches at 4.    Drinking fluids slowly.   Encouraged to drink more fluids at home.  Pulse 103

## 2024-02-07 NOTE — ED PROVIDER NOTES
EMERGENCY DEPARTMENT ENCOUNTER     [facility]     Pt Name: Lali Ta   MRN: [id number]   Birthdate 2000   Date of evaluation: 2/7/2024   Provider: Bruce Barrientos MD   PCP: Radha Lynne MD   Note Started: 9:25 AM EST 2/7/24     CHIEF COMPLAINT     Chief Complaint   Patient presents with    Pharyngitis    Chills    Headache    Concern For COVID-19     Sick since yesterday.   Dad had covid more than a week ago.  Reports sore throat at 4, HA at 2, fatigue, fever yesterday 102, nasal congestion.   No OTC meds today.  Hasn't tested for covid at home.        HISTORY OF PRESENT ILLNESS:  History from : Patient   Limitations to history : None     Lali Ta is a 23 y.o. female who presents to the emergency room via private vehicle with complaints of sore throat, general body aches and chills that have been present over the past day and a half.  Patient states that she had a sore throat yesterday but denies any cough.  Denies any difficulty swallowing or tolerating her secretions.  States that she woke up this morning with general body aches and feeling generally fatigued.  Denies any documented fevers at home.  Denies any nausea or vomiting.  Denies any chest pain, shortness of breath.  No abdominal pain.  Normal urinary and bowel habits.  Denies taking over-the-counter medications for symptoms this morning.    Nursing Notes were all reviewed and agreed with or any disagreements were addressed in the HPI.     ROS: Positives and Pertinent negatives as per HPI.    PAST MEDICAL HISTORY     Past medical history:  has a past medical history of Mood disorder (HCC).    Past surgical history:  has a past surgical history that includes Appendectomy.      PHYSICAL EXAM:  ED Triage Vitals [02/07/24 0915]   BP Temp Temp Source Pulse Respirations SpO2 Height Weight   109/69 98.7 °F (37.1 °C) Oral (!) 108 18 100 % -- --         CONSTITUTIONAL: AOx4, cooperative with exam, afebrile

## 2024-02-07 NOTE — ED NOTES
Sick since yesterday.   Dad had covid more than a week ago.  Reports sore throat at 4, HA at 2, fatigue, fever yesterday 102, nasal congestion.   No OTC meds today.  Hasn't tested for covid at home.

## 2024-02-08 ENCOUNTER — CARE COORDINATION (OUTPATIENT)
Dept: CARE COORDINATION | Age: 24
End: 2024-02-08

## 2024-02-08 NOTE — CARE COORDINATION
Care Transitions Initial Follow Up Call    Call within 2 business days of discharge: Yes     Patient: Lali Ta Patient : 2000 MRN: [id number]    Last Discharge Facility       Date Complaint Diagnosis Description Type Department Provider    24 Pharyngitis; Chills; Headache; Concern For COVID-19 Streptococcal sore throat ... ED (DISCHARGE) [facility] ED Bruce Barrientos MD            RARS: No data recorded     Spoke with: Lali    Discharge department/facility: [facility] ER    Non-face-to-face services provided:  Obtained and reviewed discharge summary and/or continuity of care documents  Assessment and support for treatment adherence and medication management-picked up medications and is taking    Pt lost medicaid- caresource when she got a job  Goes to ER because easier to apply for financial assistance than having to pay upfront at PCP office    Would like to go to PCP  Made aware of [employer]PP program and making referral to them to see if they are able to help    Pt reports she did apply for medicaid again , but was denied    SCheduled for reg follow up re hair loss  Will send message re PCP seeing pt via VV in next few days  or so re Covid. Strept  pos follow up  She has mychart but is unable to access due to needing to download a different joycelyn, so would prefer VV by phone if possible.    This ACM tried to schedule but VV telephone was blocked    Follow Up  Future Appointments   Date Time Provider Department Center   2/15/2024  1:45 PM Radha Lynne MD [facility] Michael Shin RN

## 2024-02-09 NOTE — CARE COORDINATION
Call to patient, feeling a little better, has VV scheduled.  Provided number for MHPP, Rula  and she plans on calling now, has 2 appts scheduled w PCP

## 2024-02-14 ENCOUNTER — OFFICE VISIT (OUTPATIENT)
Dept: INTERNAL MEDICINE CLINIC | Age: 24
End: 2024-02-14

## 2024-02-14 DIAGNOSIS — Z09 HOSPITAL DISCHARGE FOLLOW-UP: Primary | ICD-10-CM

## 2024-02-14 PROCEDURE — 98967 PH1 ASSMT&MGMT NQHP 11-20: CPT | Performed by: STUDENT IN AN ORGANIZED HEALTH CARE EDUCATION/TRAINING PROGRAM

## 2024-02-14 PROCEDURE — 1111F DSCHRG MED/CURRENT MED MERGE: CPT | Performed by: STUDENT IN AN ORGANIZED HEALTH CARE EDUCATION/TRAINING PROGRAM

## 2024-02-14 NOTE — PROGRESS NOTES
Post-Discharge Transitional Care  Follow Up      Lali Ta   YOB: 2000    Date of Office Visit:  2/14/2024  Date of Hospital Admission: 2/7/24  Date of Hospital Discharge: 2/7/24  Risk of hospital readmission (high >=14%. Medium >=10%) :No data recorded    Care management risk score Rising risk (score 2-5) and Complex Care (Scores >=6): No Risk Score On File     Non face to face  following discharge, date last encounter closed (first attempt may have been earlier): 02/08/2024    Call initiated 2 business days of discharge: Yes    ASSESSMENT/PLAN:   Below is the assessment and plan developed based on review of pertinent history, physical exam, labs, studies, and medications.  Hospital discharge follow-up  -     [place] DISCHARGE MEDS RECONCILED W/ CURRENT OUTPATIENT MED LIST    Medical Decision Making: low complexity  Return if symptoms worsen or fail to improve.           Subjective:   HPI:  Follow up of Hospital problems/diagnosis(es): Streptococcal pharyngitis, COVID-positive.    Inpatient course: Discharge summary reviewed- see chart.    Interval history/Current status: Patient reports that she has been feeling much better, denies of having any trouble breathing, no chest pain, no shortness of breath, she has completed the course of antibiotic and is planning to return back to work tomorrow.    Patient Active Problem List   Diagnosis    Lower abdominal pain       Medications listed as ordered at the time of discharge from hospital     Medication List            Accurate as of February 14, 2024 11:55 AM. If you have any questions, ask your nurse or doctor.                CONTINUE taking these medications      amoxicillin 500 MG capsule  Commonly known as: AMOXIL  Take 1 capsule by mouth 2 times daily for 10 days     Cepacol Sore Throat Spray 0.1-33 % Liqd  Generic drug: Dyclonine-Glycerin  2 sprays in the throat every 3 hours as needed for sore throat     ibuprofen 600 MG tablet  Commonly known

## 2024-02-14 NOTE — PROGRESS NOTES
Lali Ta (:  2000) is a 23 y.o. female,Established patient, here for evaluation of the following chief complaint(s):  Follow-up (Follow up on hair lost. Vitamin d isn't working. )      Patient reports that she has been using Depo shot for 2 years, since the last 3 months she has been losing hair.  Its thinning and there has been no break as of here.  She stopped her Depo-Provera considering it could be the cause.    ASSESSMENT/PLAN:  1. Hair loss-without any scalp lesions noted, labs including her thyroid function and electrolytes was normal.  She was provided with vitamin D supplement.  She had her haircut, continues to have here breaking.  -     Biotin 300 MCG TABS; Take 1 tablet by mouth daily, Disp-90 tablet, R-0Normal  -     minoxidil (MINOXIDIL FOR WOMEN) 2 % external solution; Apply topically 2 times daily., Disp-1 each, R-0Normal  2. Vitamin D deficiency- refills sent.  -     vitamin D3 (CHOLECALCIFEROL) 25 MCG (1000 UT) TABS tablet; Take 1 tablet by mouth daily, Disp-90 tablet, R-1Normal        Return in about 3 months (around 5/15/2024).         Subjective   SUBJECTIVE/OBJECTIVE:  HPI    Review of Systems   Constitutional: Negative.  Negative for chills and fever.   HENT: Negative.     Eyes: Negative.  Negative for discharge.   Respiratory: Negative.  Negative for shortness of breath.    Cardiovascular: Negative.  Negative for chest pain and palpitations.   Gastrointestinal: Negative.  Negative for abdominal pain.   Endocrine: Negative.    Genitourinary: Negative.    Musculoskeletal: Negative.    Skin: Negative.    Allergic/Immunologic: Negative.    Neurological: Negative.  Negative for dizziness and headaches.   Hematological: Negative.    Psychiatric/Behavioral: Negative.            Objective   Physical Exam  Vitals reviewed.   Constitutional:       Appearance: Normal appearance.   HENT:      Head: Normocephalic.   Eyes:      Extraocular Movements: Extraocular movements intact.

## 2024-02-15 ENCOUNTER — OFFICE VISIT (OUTPATIENT)
Dept: INTERNAL MEDICINE CLINIC | Age: 24
End: 2024-02-15

## 2024-02-15 VITALS
OXYGEN SATURATION: 100 % | HEIGHT: 57 IN | DIASTOLIC BLOOD PRESSURE: 60 MMHG | HEART RATE: 94 BPM | BODY MASS INDEX: 21.18 KG/M2 | WEIGHT: 98.2 LBS | SYSTOLIC BLOOD PRESSURE: 100 MMHG

## 2024-02-15 DIAGNOSIS — E55.9 VITAMIN D DEFICIENCY: ICD-10-CM

## 2024-02-15 DIAGNOSIS — L65.9 HAIR LOSS: Primary | ICD-10-CM

## 2024-02-15 PROCEDURE — 99213 OFFICE O/P EST LOW 20 MIN: CPT | Performed by: STUDENT IN AN ORGANIZED HEALTH CARE EDUCATION/TRAINING PROGRAM

## 2024-02-15 RX ORDER — MINOXIDIL 5 %
SOLUTION, NON-ORAL TOPICAL
Qty: 1 EACH | Refills: 0 | Status: SHIPPED | OUTPATIENT
Start: 2024-02-15

## 2024-02-15 RX ORDER — MELATONIN
1000 DAILY
Qty: 90 TABLET | Refills: 1 | Status: SHIPPED | OUTPATIENT
Start: 2024-02-15

## 2024-02-15 RX ORDER — LANOLIN ALCOHOL/MO/W.PET/CERES
1 CREAM (GRAM) TOPICAL DAILY
Qty: 90 TABLET | Refills: 0 | Status: SHIPPED | OUTPATIENT
Start: 2024-02-15

## 2024-02-22 ENCOUNTER — TELEPHONE (OUTPATIENT)
Dept: INTERNAL MEDICINE CLINIC | Age: 24
End: 2024-02-22

## 2024-02-22 NOTE — TELEPHONE ENCOUNTER
Pt called, [facility] did not receive her Rx for minoxidil (MINOXIDIL FOR WOMEN) 2 % external solution     Please resend to [facility] on [address]    Thank you

## 2024-02-23 DIAGNOSIS — L65.9 HAIR LOSS: ICD-10-CM

## 2024-02-23 RX ORDER — MINOXIDIL 5 %
SOLUTION, NON-ORAL TOPICAL
Qty: 1 EACH | Refills: 0 | Status: SHIPPED | OUTPATIENT
Start: 2024-02-23

## 2024-02-26 ENCOUNTER — HOSPITAL ENCOUNTER (EMERGENCY)
Age: 24
Discharge: HOME | End: 2024-02-26
Attending: STUDENT IN AN ORGANIZED HEALTH CARE EDUCATION/TRAINING PROGRAM

## 2024-02-26 ENCOUNTER — APPOINTMENT (OUTPATIENT)
Dept: ULTRASOUND IMAGING | Age: 24
End: 2024-02-26

## 2024-02-26 VITALS
HEIGHT: 57 IN | BODY MASS INDEX: 21.5 KG/M2 | OXYGEN SATURATION: 100 % | DIASTOLIC BLOOD PRESSURE: 57 MMHG | HEART RATE: 80 BPM | RESPIRATION RATE: 16 BRPM | SYSTOLIC BLOOD PRESSURE: 93 MMHG | TEMPERATURE: 98.1 F | WEIGHT: 99.65 LBS

## 2024-02-26 DIAGNOSIS — R11.2 NAUSEA AND VOMITING, UNSPECIFIED VOMITING TYPE: Primary | ICD-10-CM

## 2024-02-26 DIAGNOSIS — R10.84 GENERALIZED ABDOMINAL PAIN: ICD-10-CM

## 2024-02-26 DIAGNOSIS — N39.0 ACUTE URINARY TRACT INFECTION: ICD-10-CM

## 2024-02-26 LAB
ALBUMIN SERPL-MCNC: 4.7 G/DL (ref 3.4–5)
ALBUMIN/GLOB SERPL: 1.3 {RATIO} (ref 1.1–2.2)
ALP SERPL-CCNC: 115 U/L (ref 40–129)
ALT SERPL-CCNC: 9 U/L (ref 10–40)
ANION GAP SERPL CALCULATED.3IONS-SCNC: 13 MMOL/L (ref 3–16)
AST SERPL-CCNC: 18 U/L (ref 15–37)
BACTERIA GENITAL QL WET PREP: NORMAL
BACTERIA URNS QL MICRO: ABNORMAL /HPF
BASOPHILS # BLD: 0 K/UL (ref 0–0.2)
BASOPHILS NFR BLD: 0.7 %
BILIRUB SERPL-MCNC: 0.5 MG/DL (ref 0–1)
BILIRUB UR QL STRIP.AUTO: NEGATIVE
BUN SERPL-MCNC: 9 MG/DL (ref 7–20)
CALCIUM SERPL-MCNC: 9.3 MG/DL (ref 8.3–10.6)
CHLORIDE SERPL-SCNC: 102 MMOL/L (ref 99–110)
CLARITY UR: ABNORMAL
CLUE CELLS SPEC QL WET PREP: NORMAL
CO2 SERPL-SCNC: 24 MMOL/L (ref 21–32)
COLOR UR: YELLOW
CREAT SERPL-MCNC: 0.5 MG/DL (ref 0.6–1.1)
DEPRECATED RDW RBC AUTO: 12.7 % (ref 12.4–15.4)
EOSINOPHIL # BLD: 0.2 K/UL (ref 0–0.6)
EOSINOPHIL NFR BLD: 3.5 %
EPI CELLS #/AREA URNS AUTO: 6 /HPF (ref 0–5)
EPI CELLS SPEC QL WET PREP: NORMAL
FLUAV RNA UPPER RESP QL NAA+PROBE: NEGATIVE
FLUBV AG NPH QL: NEGATIVE
GFR SERPLBLD CREATININE-BSD FMLA CKD-EPI: >60 ML/MIN/{1.73_M2}
GLUCOSE SERPL-MCNC: 84 MG/DL (ref 70–99)
GLUCOSE UR STRIP.AUTO-MCNC: NEGATIVE MG/DL
HCG UR QL: NEGATIVE
HCT VFR BLD AUTO: 38.5 % (ref 36–48)
HGB BLD-MCNC: 13.4 G/DL (ref 12–16)
HGB UR QL STRIP.AUTO: NEGATIVE
HYALINE CASTS #/AREA URNS AUTO: 3 /LPF (ref 0–8)
KETONES UR STRIP.AUTO-MCNC: NEGATIVE MG/DL
LEUKOCYTE ESTERASE UR QL STRIP.AUTO: NEGATIVE
LIPASE SERPL-CCNC: 16 U/L (ref 13–60)
LYMPHOCYTES # BLD: 2.1 K/UL (ref 1–5.1)
LYMPHOCYTES NFR BLD: 34.8 %
MCH RBC QN AUTO: 31 PG (ref 26–34)
MCHC RBC AUTO-ENTMCNC: 34.7 G/DL (ref 31–36)
MCV RBC AUTO: 89.2 FL (ref 80–100)
MONOCYTES # BLD: 1.1 K/UL (ref 0–1.3)
MONOCYTES NFR BLD: 18 %
NEUTROPHILS # BLD: 2.6 K/UL (ref 1.7–7.7)
NEUTROPHILS NFR BLD: 43 %
NITRITE UR QL STRIP.AUTO: POSITIVE
PH UR STRIP.AUTO: 5.5 [PH] (ref 5–8)
PLATELET # BLD AUTO: 265 K/UL (ref 135–450)
PMV BLD AUTO: 9.5 FL (ref 5–10.5)
POTASSIUM SERPL-SCNC: 3.8 MMOL/L (ref 3.5–5.1)
PROT SERPL-MCNC: 8.2 G/DL (ref 6.4–8.2)
PROT UR STRIP.AUTO-MCNC: NEGATIVE MG/DL
RBC # BLD AUTO: 4.32 M/UL (ref 4–5.2)
RBC CLUMPS #/AREA URNS AUTO: 0 /HPF (ref 0–4)
RBC SPEC QL WET PREP: NORMAL
SARS-COV-2 RDRP RESP QL NAA+PROBE: NOT DETECTED
SODIUM SERPL-SCNC: 139 MMOL/L (ref 136–145)
SP GR UR STRIP.AUTO: 1.03 (ref 1–1.03)
SPECIMEN SOURCE FLD: NORMAL
T VAGINALIS GENITAL QL WET PREP: NORMAL
UA COMPLETE W REFLEX CULTURE PNL UR: ABNORMAL
UA DIPSTICK W REFLEX MICRO PNL UR: YES
UROBILINOGEN UR STRIP-ACNC: 1 E.U./DL
WBC # BLD AUTO: 6 K/UL (ref 4–11)
WBC #/AREA URNS AUTO: 6 /HPF (ref 0–5)
WBC SPEC QL WET PREP: NORMAL
YEAST GENITAL QL WET PREP: NORMAL

## 2024-02-26 PROCEDURE — 96374 THER/PROPH/DIAG INJ IV PUSH: CPT

## 2024-02-26 PROCEDURE — 81001 URINALYSIS AUTO W/SCOPE: CPT

## 2024-02-26 PROCEDURE — 2580000003 HC RX 258: Performed by: PHYSICIAN ASSISTANT

## 2024-02-26 PROCEDURE — 93975 VASCULAR STUDY: CPT

## 2024-02-26 PROCEDURE — 87804 INFLUENZA ASSAY W/OPTIC: CPT

## 2024-02-26 PROCEDURE — 87635 SARS-COV-2 COVID-19 AMP PRB: CPT

## 2024-02-26 PROCEDURE — 80053 COMPREHEN METABOLIC PANEL: CPT

## 2024-02-26 PROCEDURE — 87591 N.GONORRHOEAE DNA AMP PROB: CPT

## 2024-02-26 PROCEDURE — 83690 ASSAY OF LIPASE: CPT

## 2024-02-26 PROCEDURE — 87491 CHLMYD TRACH DNA AMP PROBE: CPT

## 2024-02-26 PROCEDURE — 6360000002 HC RX W HCPCS: Performed by: PHYSICIAN ASSISTANT

## 2024-02-26 PROCEDURE — 96361 HYDRATE IV INFUSION ADD-ON: CPT

## 2024-02-26 PROCEDURE — 85025 COMPLETE CBC W/AUTO DIFF WBC: CPT

## 2024-02-26 PROCEDURE — 6360000002 HC RX W HCPCS: Performed by: STUDENT IN AN ORGANIZED HEALTH CARE EDUCATION/TRAINING PROGRAM

## 2024-02-26 PROCEDURE — 87210 SMEAR WET MOUNT SALINE/INK: CPT

## 2024-02-26 PROCEDURE — 84703 CHORIONIC GONADOTROPIN ASSAY: CPT

## 2024-02-26 PROCEDURE — 99284 EMERGENCY DEPT VISIT MOD MDM: CPT

## 2024-02-26 PROCEDURE — 76830 TRANSVAGINAL US NON-OB: CPT

## 2024-02-26 RX ORDER — ONDANSETRON 2 MG/ML
4 INJECTION INTRAMUSCULAR; INTRAVENOUS ONCE
Status: COMPLETED | OUTPATIENT
Start: 2024-02-26 | End: 2024-02-26

## 2024-02-26 RX ORDER — KETOROLAC TROMETHAMINE 15 MG/ML
15 INJECTION, SOLUTION INTRAMUSCULAR; INTRAVENOUS ONCE
Status: COMPLETED | OUTPATIENT
Start: 2024-02-26 | End: 2024-02-26

## 2024-02-26 RX ORDER — NITROFURANTOIN 25; 75 MG/1; MG/1
100 CAPSULE ORAL 2 TIMES DAILY
Qty: 10 CAPSULE | Refills: 0 | Status: SHIPPED | OUTPATIENT
Start: 2024-02-26 | End: 2024-03-02

## 2024-02-26 RX ORDER — ONDANSETRON 4 MG/1
4 TABLET, ORALLY DISINTEGRATING ORAL EVERY 8 HOURS PRN
Qty: 21 TABLET | Refills: 0 | Status: SHIPPED | OUTPATIENT
Start: 2024-02-26

## 2024-02-26 RX ORDER — 0.9 % SODIUM CHLORIDE 0.9 %
1000 INTRAVENOUS SOLUTION INTRAVENOUS ONCE
Status: COMPLETED | OUTPATIENT
Start: 2024-02-26 | End: 2024-02-26

## 2024-02-26 RX ADMIN — SODIUM CHLORIDE 1000 ML: 9 INJECTION, SOLUTION INTRAVENOUS at 17:41

## 2024-02-26 RX ADMIN — KETOROLAC TROMETHAMINE 15 MG: 15 INJECTION, SOLUTION INTRAMUSCULAR; INTRAVENOUS at 18:23

## 2024-02-26 RX ADMIN — ONDANSETRON 4 MG: 2 INJECTION INTRAMUSCULAR; INTRAVENOUS at 17:21

## 2024-02-26 ASSESSMENT — PAIN DESCRIPTION - DESCRIPTORS
DESCRIPTORS: DULL
DESCRIPTORS: THROBBING
DESCRIPTORS: DISCOMFORT

## 2024-02-26 ASSESSMENT — PAIN DESCRIPTION - ORIENTATION
ORIENTATION: LOWER

## 2024-02-26 ASSESSMENT — PAIN DESCRIPTION - FREQUENCY: FREQUENCY: CONTINUOUS

## 2024-02-26 ASSESSMENT — PAIN DESCRIPTION - PAIN TYPE: TYPE: ACUTE PAIN

## 2024-02-26 ASSESSMENT — PAIN - FUNCTIONAL ASSESSMENT: PAIN_FUNCTIONAL_ASSESSMENT: ACTIVITIES ARE NOT PREVENTED

## 2024-02-26 ASSESSMENT — PAIN DESCRIPTION - LOCATION
LOCATION: ABDOMEN
LOCATION: ABDOMEN

## 2024-02-26 ASSESSMENT — PAIN SCALES - GENERAL
PAINLEVEL_OUTOF10: 7
PAINLEVEL_OUTOF10: 4
PAINLEVEL_OUTOF10: 8

## 2024-02-26 NOTE — ED TRIAGE NOTES
Patient to the ER with complaints of abdominal pain with emesis that started last night.  Patient says she has been unable to keep anything down and is beginning to feel dizzy.     Alert and oriented x4 and ambulatory at time of triage.

## 2024-02-27 NOTE — ED PROVIDER NOTES
This is my TRIPP Supervisory and shared visit note:     This patient was seen by the advanced practice provider.     I personally saw the patient and made/approved the management plan and take responsibility for the patient management.      Briefly, 23 y.o. female presents with left lower quadrant abdominal pain and suprapubic tenderness.  Onset of symptoms started last night.  Patient also reports nausea.  Patient states she is unable to keep anything down.  Patient states initially she woke up and felt lightheaded and then started experiencing abdominal pain.  Patient denies urinary discomfort, dysuria, increased urinary frequency, vaginal discharge, vaginal bleeding, fever, chest pain, shortness of breath.  Patient states pain is mostly in her left lower quadrant.  Patient's last menstrual period was in July, patient currently takes Depo shots.  Patient denies any prior GI history.    Focused exam:   Gen: awake, alert, and NAD  HEENT: NCAT. EOMI.   CV: RRR w/o MRG  Lungs: CTAB. No incr WOB.   Abdomen: Soft, tenderness in left lower quadrant with palpation and in suprapubic region with palpation.  No rebound/guarding. No CVA tenderness.   Neuro: Moving all extremities, fluent speech, follows commands     MDM:   Patient is hematemesis able upon arrival to the emergency department.  Patient is afebrile.  Overall patient appears well on exam.    Differential diagnosis includes but not limited to pregnancy, SBO, pancreatitis, intra-abdominal abscess, perforated viscus, gastritis, biliary colic, diverticulitis, colitis, constipation, acute cholecystitis, acute appendicitis, ovarian torsion, ectopic pregnancy, nephrolithiasis, gastritis, peptic ulcer disease, mesenteric ischemia, referred pain due to pneumonia,UTI,  hyperemesis secondary to cannabinoid use, cyclic vomiting disorder,  functional abdominal pain, AAA, hernia, and abdominal wall rectus muscle pain.    Patient was given Zofran, IV fluids, Toradol for abdominal 
concern for STDs.  Pregnancy test negative.  Urine showed evidence of infection.    Given increased pain and onset in the left lower quadrant obtain transvaginal ultrasound.  Ultrasound pending at time of my departure.  Will hold additional imaging at this time as she is not an acute abdomen.    Patient was given 1 L IV fluid and IV Zofran.  At reevaluation she voiced improvement of symptoms.  Please see my attendings note regarding final disposition pending workup and reassessment.    Disposition Considerations (tests considered but not done, Admit vs D/C, Shared Decision Making, Pt Expectation of Test or Tx.): see above       I am the Primary Clinician of Record.  FINAL IMPRESSION      1. Nausea and vomiting, unspecified vomiting type    2. Generalized abdominal pain    3. Acute urinary tract infection          DISPOSITION/PLAN     DISPOSITION Decision To Discharge 02/26/2024 09:00:58 PM      PATIENT REFERRED TO:  Your PCP    Call   for follow up      DISCHARGE MEDICATIONS:  Discharge Medication List as of 2/26/2024  9:04 PM        START taking these medications    Details   nitrofurantoin, macrocrystal-monohydrate, (MACROBID) 100 MG capsule Take 1 capsule by mouth 2 times daily for 5 days, Disp-10 capsule, R-0Normal      ondansetron (ZOFRAN-ODT) 4 MG disintegrating tablet Take 1 tablet by mouth every 8 hours as needed for Nausea or Vomiting, Disp-21 tablet, R-0Normal             DISCONTINUED MEDICATIONS:  Discharge Medication List as of 2/26/2024  9:04 PM                 (Please note that portions of this note were completed with a voice recognition program.  Efforts were made to edit the dictations but occasionally words are mis-transcribed.)    Shannon Boyd PA-C (electronically signed)          Shannon Boyd PA-C  02/27/24 4194

## 2024-02-28 LAB
C TRACH DNA CVX QL NAA+PROBE: NEGATIVE
N GONORRHOEA DNA CERV MUCUS QL NAA+PROBE: NEGATIVE

## 2024-03-22 ENCOUNTER — HOSPITAL ENCOUNTER (EMERGENCY)
Age: 24
Discharge: HOME | End: 2024-03-22
Attending: EMERGENCY MEDICINE

## 2024-03-22 VITALS
BODY MASS INDEX: 21.64 KG/M2 | HEART RATE: 91 BPM | SYSTOLIC BLOOD PRESSURE: 98 MMHG | HEIGHT: 57 IN | OXYGEN SATURATION: 98 % | WEIGHT: 100.31 LBS | TEMPERATURE: 99.4 F | RESPIRATION RATE: 18 BRPM | DIASTOLIC BLOOD PRESSURE: 63 MMHG

## 2024-03-22 DIAGNOSIS — N30.01 ACUTE CYSTITIS WITH HEMATURIA: ICD-10-CM

## 2024-03-22 DIAGNOSIS — S39.012A STRAIN OF LUMBAR REGION, INITIAL ENCOUNTER: Primary | ICD-10-CM

## 2024-03-22 LAB
BACTERIA URNS QL MICRO: ABNORMAL /HPF
BILIRUB UR QL STRIP.AUTO: NEGATIVE
CLARITY UR: CLEAR
COLOR UR: YELLOW
EPI CELLS #/AREA URNS HPF: ABNORMAL /HPF (ref 0–5)
GLUCOSE UR STRIP.AUTO-MCNC: NEGATIVE MG/DL
HCG UR QL: NEGATIVE
HGB UR QL STRIP.AUTO: ABNORMAL
KETONES UR STRIP.AUTO-MCNC: NEGATIVE MG/DL
LEUKOCYTE ESTERASE UR QL STRIP.AUTO: NEGATIVE
NITRITE UR QL STRIP.AUTO: POSITIVE
PH UR STRIP.AUTO: 6 [PH] (ref 5–8)
PROT UR STRIP.AUTO-MCNC: NEGATIVE MG/DL
RBC #/AREA URNS HPF: ABNORMAL /HPF (ref 0–4)
SP GR UR STRIP.AUTO: <=1.005 (ref 1–1.03)
UA COMPLETE W REFLEX CULTURE PNL UR: ABNORMAL
UA DIPSTICK W REFLEX MICRO PNL UR: YES
URN SPEC COLLECT METH UR: ABNORMAL
UROBILINOGEN UR STRIP-ACNC: 0.2 E.U./DL
WBC #/AREA URNS HPF: ABNORMAL /HPF (ref 0–5)

## 2024-03-22 PROCEDURE — 81001 URINALYSIS AUTO W/SCOPE: CPT

## 2024-03-22 PROCEDURE — 99283 EMERGENCY DEPT VISIT LOW MDM: CPT

## 2024-03-22 PROCEDURE — 84703 CHORIONIC GONADOTROPIN ASSAY: CPT

## 2024-03-22 RX ORDER — IBUPROFEN 800 MG/1
800 TABLET ORAL EVERY 8 HOURS PRN
Qty: 20 TABLET | Refills: 0 | Status: SHIPPED | OUTPATIENT
Start: 2024-03-22

## 2024-03-22 RX ORDER — NITROFURANTOIN 25; 75 MG/1; MG/1
100 CAPSULE ORAL 2 TIMES DAILY
Qty: 10 CAPSULE | Refills: 0 | Status: SHIPPED | OUTPATIENT
Start: 2024-03-22 | End: 2024-03-27

## 2024-03-22 NOTE — ED NOTES
Patient aware of need for urine sample, patient unable to urinate at this time. Provider aware. Patient will call out when able to provide sample.    Patient provided with additional 1L of PO water

## 2024-03-22 NOTE — DISCHARGE INSTRUCTIONS
Take Motrin for back pain.  And antibiotics twice a day for UTI for 5 days.  Follow-up with your doctor.  Off work x 1 day.  Follow-up may return to work neck scheduled shift.

## 2024-03-22 NOTE — ED TRIAGE NOTES
Patient presents to ED complaining of back pain which started yesterday at work. Patient reports she does a lot of pulling heavy things around at work. Patient also reports some abdominal pain which worsens with movement which she noticed this morning after waking up. Reports pain is also increased in back and abdomen with deep inspiration.    Patient resting on bed, respirations even and easy at this time. No obvious distress.    Patient reports increased pain on right side of abdomen with gentle palpation.

## 2024-03-22 NOTE — ED PROVIDER NOTES
no murmurs rubs or gallops  Pulmonary: Lungs are clear in all lung fields.  No wheezing.  No Rales.  Abdomen: Soft and nontender.  Negative hepatosplenomegaly.  Bowel sounds are active  Extremities: Moving all extremities.  No calf tenderness.  Peripheral pulses all intact  Skin: No skin lesions.  No rashes  Neurologic: Cranial nerves II through XII was grossly intact.  Nonfocal neurological exam  Psychiatric: Patient is pleasant.  Mood is appropriate.        DIAGNOSTIC RESULTS     EKG (Per Emergency Physician):       RADIOLOGY (Per Emergency Physician):       Interpretation per the Radiologist below, if available at the time of this note:  No results found.    ED BEDSIDE ULTRASOUND:   Performed by ED Physician - none    LABS:  Labs Reviewed   URINALYSIS WITH REFLEX TO CULTURE - Abnormal; Notable for the following components:       Result Value    Blood, Urine LARGE (*)     Nitrite, Urine POSITIVE (*)     All other components within normal limits   MICROSCOPIC URINALYSIS - Abnormal; Notable for the following components:    Bacteria, UA 1+ (*)     All other components within normal limits   PREGNANCY, URINE        All other labs were within normal range or not returned as of this dictation.      Procedures      EMERGENCY DEPARTMENT COURSE and DIFFERENTIAL DIAGNOSIS/MDM:   Vitals:    Vitals:    03/22/24 1202   BP: 106/71   Pulse: (!) 107   Resp: 18   Temp: 99.4 °F (37.4 °C)   TempSrc: Oral   SpO2: 98%   Weight: 45.5 kg (100 lb 5 oz)   Height: 1.448 m (4' 9\")       Medications - No data to display    MDM  .  Patient is a healthy 23-year-old with low back pain and some suprapubic abdominal pain.  There is no dysuria.  Her urinalysis shows blood and nitrates.  Could be a early bladder infection patient will be placed on Macrobid for 5 days.  I suspect her back pain based on exam is probably muscle skeletal is reproducible.  Patient does a lot of heavy lifting.  Patient requested a work note x 1 day.  Patient works at

## 2024-03-28 ENCOUNTER — OFFICE VISIT (OUTPATIENT)
Dept: INTERNAL MEDICINE CLINIC | Age: 24
End: 2024-03-28

## 2024-03-28 VITALS
DIASTOLIC BLOOD PRESSURE: 80 MMHG | SYSTOLIC BLOOD PRESSURE: 115 MMHG | OXYGEN SATURATION: 99 % | HEART RATE: 88 BPM | BODY MASS INDEX: 21.36 KG/M2 | WEIGHT: 99 LBS | HEIGHT: 57 IN

## 2024-03-28 DIAGNOSIS — Z09 HOSPITAL DISCHARGE FOLLOW-UP: Primary | ICD-10-CM

## 2024-03-28 DIAGNOSIS — Z76.89 RETURN TO WORK EXAM: ICD-10-CM

## 2024-03-28 DIAGNOSIS — S39.012D STRAIN OF LUMBAR REGION, SUBSEQUENT ENCOUNTER: ICD-10-CM

## 2024-03-28 PROCEDURE — 99214 OFFICE O/P EST MOD 30 MIN: CPT | Performed by: STUDENT IN AN ORGANIZED HEALTH CARE EDUCATION/TRAINING PROGRAM

## 2024-03-28 RX ORDER — TIZANIDINE 2 MG/1
2 TABLET ORAL NIGHTLY PRN
Qty: 10 TABLET | Refills: 0 | Status: SHIPPED | OUTPATIENT
Start: 2024-03-28

## 2024-03-28 ASSESSMENT — ENCOUNTER SYMPTOMS
RESPIRATORY NEGATIVE: 1
EYE DISCHARGE: 0
EYES NEGATIVE: 1
ALLERGIC/IMMUNOLOGIC NEGATIVE: 1
BACK PAIN: 1
SHORTNESS OF BREATH: 0
ABDOMINAL PAIN: 0
GASTROINTESTINAL NEGATIVE: 1

## 2024-03-28 NOTE — PROGRESS NOTES
Lali Ta (:  2000) is a 23 y.o. female,Established patient, here for evaluation of the following chief complaint(s):  Follow-up (Back pain)      Patient reports that she was seen in the ER after she pulled a muscle at work.  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  She sometimes pulls and lifts heavy weight, had to hook them to the car.  This has resulted in the muscle sprain.  She has been taking naproxen with some relief but her pain continues.         ASSESSMENT/PLAN:  1. Hospital discharge follow-up, continues to have pain in her lower back.  Has been using naproxen as needed.  2. Strain of lumbar region, subsequent encounter use of tizanidine as needed.  -     tiZANidine (ZANAFLEX) 2 MG tablet; Take 1 tablet by mouth nightly as needed (muscle spasm), Disp-10 tablet, R-0Normal  3. Return to work exam, temporary work restriction provided.      Return if symptoms worsen or fail to improve.         Subjective   SUBJECTIVE/OBJECTIVE:  Back Pain  This is a new problem. The current episode started in the past 7 days. The pain is present in the sacro-iliac and lumbar spine. The quality of the pain is described as cramping. The pain does not radiate. The symptoms are aggravated by bending and twisting. Pertinent negatives include no abdominal pain, chest pain, fever, headaches, perianal numbness, tingling or weakness. She has tried NSAIDs for the symptoms. The treatment provided mild relief.       Review of Systems   Constitutional: Negative.  Negative for chills and fever.   HENT: Negative.     Eyes: Negative.  Negative for discharge.   Respiratory: Negative.  Negative for shortness of breath.    Cardiovascular: Negative.  Negative for chest pain and palpitations.   Gastrointestinal: Negative.  Negative for abdominal pain.   Endocrine: Negative.    Genitourinary: Negative.    Musculoskeletal:  Positive for back pain.   Skin: Negative.    Allergic/Immunologic: Negative.

## 2024-04-09 ENCOUNTER — HOSPITAL ENCOUNTER (EMERGENCY)
Age: 24
Discharge: HOME | End: 2024-04-09
Attending: EMERGENCY MEDICINE

## 2024-04-09 VITALS
BODY MASS INDEX: 21.93 KG/M2 | WEIGHT: 101.63 LBS | HEART RATE: 83 BPM | DIASTOLIC BLOOD PRESSURE: 69 MMHG | TEMPERATURE: 98.3 F | OXYGEN SATURATION: 100 % | SYSTOLIC BLOOD PRESSURE: 107 MMHG | RESPIRATION RATE: 16 BRPM | HEIGHT: 57 IN

## 2024-04-09 DIAGNOSIS — H92.01 RIGHT EAR PAIN: Primary | ICD-10-CM

## 2024-04-09 PROCEDURE — 6370000000 HC RX 637 (ALT 250 FOR IP): Performed by: EMERGENCY MEDICINE

## 2024-04-09 PROCEDURE — 99283 EMERGENCY DEPT VISIT LOW MDM: CPT

## 2024-04-09 RX ORDER — IBUPROFEN 600 MG/1
600 TABLET ORAL 4 TIMES DAILY PRN
Qty: 40 TABLET | Refills: 0 | Status: SHIPPED | OUTPATIENT
Start: 2024-04-09

## 2024-04-09 RX ORDER — IBUPROFEN 600 MG/1
600 TABLET ORAL
Status: COMPLETED | OUTPATIENT
Start: 2024-04-09 | End: 2024-04-09

## 2024-04-09 RX ADMIN — IBUPROFEN 600 MG: 600 TABLET, FILM COATED ORAL at 21:53

## 2024-04-09 ASSESSMENT — PAIN SCALES - GENERAL: PAINLEVEL_OUTOF10: 4

## 2024-04-10 NOTE — ED PROVIDER NOTES
Paternal Grandmother           SOCIAL HISTORY       Social History     Socioeconomic History    Marital status: Single     Spouse name: None    Number of children: None    Years of education: None    Highest education level: None   Tobacco Use    Smoking status: Never    Smokeless tobacco: Never   Vaping Use    Vaping Use: Never used   Substance and Sexual Activity    Alcohol use: Never    Drug use: Never    Sexual activity: Yes     Partners: Male     Social Determinants of Health     Financial Resource Strain: Low Risk  (2/14/2024)    Overall Financial Resource Strain (CARDIA)     Difficulty of Paying Living Expenses: Not hard at all   Food Insecurity: No Food Insecurity (2/14/2024)    Hunger Vital Sign     Worried About Running Out of Food in the Last Year: Never true     Ran Out of Food in the Last Year: Never true   Transportation Needs: Unknown (2/14/2024)    PRAPARE - Transportation     Lack of Transportation (Non-Medical): No   Housing Stability: Unknown (2/14/2024)    Housing Stability Vital Sign     Unstable Housing in the Last Year: No       SCREENINGS         [place] Coma Scale  Eye Opening: Spontaneous  Best Verbal Response: Oriented  Best Motor Response: Obeys commands  [place] Coma Scale Score: 15                     CIWA Assessment  BP: 107/69  Pulse: 83                 PHYSICAL EXAM    (up to 7 for level 4, 8 or more for level 5)     ED Triage Vitals [04/09/24 2043]   BP Temp Temp Source Pulse Respirations SpO2 Height Weight - Scale   107/69 98.3 °F (36.8 °C) Oral 83 16 100 % 1.448 m (4' 9\") 46.1 kg (101 lb 10.1 oz)       GEN: Well nourished, well developed, no acute distress  HEAD: Normocephalic, atraumatic.  No step-offs or deformities  EYES: Pupils equally round and reactive to light.  Extraocular movements intact.  Anicteric sclera  ENT: No nasal discharge.  No visible trauma.  Pink moist mucous membranes..  No lip, throat, or tongue swelling.  Good dentition.  No dental tenderness to percussion.

## 2024-04-12 ENCOUNTER — HOSPITAL ENCOUNTER (EMERGENCY)
Age: 24
Discharge: HOME | End: 2024-04-12
Attending: EMERGENCY MEDICINE

## 2024-04-12 VITALS
HEIGHT: 57 IN | HEART RATE: 80 BPM | OXYGEN SATURATION: 99 % | DIASTOLIC BLOOD PRESSURE: 67 MMHG | BODY MASS INDEX: 21.45 KG/M2 | WEIGHT: 99.43 LBS | SYSTOLIC BLOOD PRESSURE: 103 MMHG | RESPIRATION RATE: 16 BRPM | TEMPERATURE: 97.6 F

## 2024-04-12 DIAGNOSIS — H65.01 RIGHT ACUTE SEROUS OTITIS MEDIA, RECURRENCE NOT SPECIFIED: Primary | ICD-10-CM

## 2024-04-12 PROCEDURE — 6370000000 HC RX 637 (ALT 250 FOR IP): Performed by: EMERGENCY MEDICINE

## 2024-04-12 PROCEDURE — 99283 EMERGENCY DEPT VISIT LOW MDM: CPT

## 2024-04-12 RX ORDER — AMOXICILLIN 875 MG/1
875 TABLET, COATED ORAL 2 TIMES DAILY
Qty: 20 TABLET | Refills: 0 | Status: SHIPPED | OUTPATIENT
Start: 2024-04-12 | End: 2024-04-22

## 2024-04-12 RX ORDER — AMOXICILLIN 250 MG/1
750 CAPSULE ORAL ONCE
Status: COMPLETED | OUTPATIENT
Start: 2024-04-12 | End: 2024-04-12

## 2024-04-12 RX ORDER — MELOXICAM 7.5 MG/1
7.5-15 TABLET ORAL DAILY
Qty: 20 TABLET | Refills: 0 | Status: SHIPPED | OUTPATIENT
Start: 2024-04-12 | End: 2024-04-22

## 2024-04-12 RX ADMIN — AMOXICILLIN 750 MG: 250 CAPSULE ORAL at 14:43

## 2024-04-12 ASSESSMENT — PAIN SCALES - GENERAL: PAINLEVEL_OUTOF10: 6

## 2024-04-12 ASSESSMENT — PAIN - FUNCTIONAL ASSESSMENT: PAIN_FUNCTIONAL_ASSESSMENT: 0-10

## 2024-04-12 ASSESSMENT — PAIN DESCRIPTION - DESCRIPTORS: DESCRIPTORS: ACHING

## 2024-04-12 ASSESSMENT — PAIN DESCRIPTION - LOCATION: LOCATION: FACE;JAW;EAR

## 2024-04-12 ASSESSMENT — PAIN DESCRIPTION - FREQUENCY: FREQUENCY: CONTINUOUS

## 2024-04-12 ASSESSMENT — PAIN DESCRIPTION - ORIENTATION: ORIENTATION: RIGHT

## 2024-04-12 NOTE — DISCHARGE INSTRUCTIONS
I did change your ibuprofen to meloxicam.  This is a different type of anti-inflammatory.  Do not take any other kind of anti-inflammatory while you are taking the meloxicam.  Make sure you take it with food and plenty of water.    When I reviewed your medical record I saw in the emergency department on the ninth they did prescribe you 800 mg of ibuprofen.  You can take this 3 times a day.  You can also decrease it to 400 mg (by taking half of a tablet) and take it every 4 hours.     In addition to taking the meloxicam (or a different anti-inflammatory pain medication) I do recommend doing this in addition to taking Tylenol.  You can take 650 mg of Tylenol every 4 hours.  You can also take 500 mg every 3 hours.    Please follow-up with your primary care next week.  Return to ED for any new or worsening symptoms or concerns

## 2024-04-12 NOTE — ED PROVIDER NOTES
[facility] EMERGENCY DEPARTMENT  EMERGENCY DEPARTMENT ENCOUNTER        Pt Name: Lali Ta  MRN: [id number]  Birthdate 2000  Date of evaluation: 4/12/2024  Provider: Sita Naranjo MD  PCP: Radha Lynne MD  Note Started: 2:25 PM EDT 4/12/24    CHIEF COMPLAINT     Right ear pain  HISTORY OF PRESENT ILLNESS: 1 or more Elements     Chief Complaint   Patient presents with    Otalgia     Pt arrives ambulatory for eval of right ear pain moving into her face, sts seen at ED three days ago and dxd with michael and told to take ibuprofen but pain is worsening.     History from : Patient  Limitations to history : None    Lali aT is a 23 y.o. female who presents to the emergency department secondary to concern for right ear pain.  She states that it started approximately 6 days ago now.  She states she was seen at another emergency department a few days ago and prescribed ibuprofen which she has been taking 3 times a day as prescribed.  She feels like her ear pain is getting worse.  She also has had some drainage from it that started yesterday.  She denies any fever, chills, nausea, vomiting.  She has no    Past medical history noted below.  Denies smoking.  Aside from what is stated above denies any other symptoms or modifying factors.    Nursing Notes were all reviewed and agreed with or any disagreements addressed in HPI/MDM.  REVIEW OF SYSTEMS :    Review of Systems Pertinent positive and negative findings as documented in the HPI  PAST MEDICAL HISTORY     Past Medical History:   Diagnosis Date    Mood disorder (HCC)        SURGICALHISTORY       Past Surgical History:   Procedure Laterality Date    APPENDECTOMY       CURRENT MEDICATIONS       Discharge Medication List as of 4/12/2024  2:45 PM        CONTINUE these medications which have NOT CHANGED    Details   !! ibuprofen (ADVIL;MOTRIN) 600 MG tablet Take 1 tablet by mouth 4 times daily as needed for Pain, Disp-40 tablet,  her primary care as well as strict return precautions.     After discussion of diagnosis and symptomatic care, I reiterated return precautions and importance of follow-up. She expressed understanding of all instructions, was in agreement with plan, and all questions were answered. She was discharged home in stable condition.          Disposition Considerations (tests considered but not done, Shared Decision Making, Pt Expectation of Test or Tx.): Appropriate for outpatient management      I estimate there is low risk for sepsis, MI, stroke, tamponade, PTX, toxicity, or other life threatening etiology. Given the best available information and clinical assessment I estimate the risk of hospitalization to be greater than risk of treatment at home. We discussed and I explained the risk could rapidly change and return precautions and instructions given. Discharge disposition is reasonable.     I am the Primary Clinician of Record.  FINAL IMPRESSION      1. Right acute serous otitis media, recurrence not specified          DISPOSITION/PLAN   DISPOSITION Decision To Discharge 04/12/2024 02:26:01 PM      PATIENT REFERRED TO:  Radha Lynne MD  [address] [address]  [phone]    Schedule an appointment as soon as possible for a visit in 1 week  For follow up appointment      DISCHARGE MEDICATIONS:  Discharge Medication List as of 4/12/2024  2:45 PM        START taking these medications    Details   amoxicillin (AMOXIL) 875 MG tablet Take 1 tablet by mouth 2 times daily for 10 days, Disp-20 tablet, R-0Normal      meloxicam (MOBIC) 7.5 MG tablet Take 1-2 tablets by mouth daily for 10 days, Disp-20 tablet, R-0Normal                (Please note that portions of this note were completed with a voice recognition program.  Efforts were made to edit the dictations but occasionally words are mis-transcribed.)    Sita Naranjo MD (electronically signed)  Attending Emergency Physician     Marcella

## 2024-04-12 NOTE — ED TRIAGE NOTES
Pt arrives ambulatory for eval of right ear pain moving into her face, sts seen at ED three days ago and dxd with michael and told to take ibuprofen but pain is worsening.. Pt denies fever, chills, n/v. Pt is a/ox4, resp nonlabored and skin race appropriate, warm and dry.

## 2024-07-15 ENCOUNTER — HOSPITAL ENCOUNTER (EMERGENCY)
Age: 24
Discharge: HOME | End: 2024-07-15
Attending: EMERGENCY MEDICINE
Payer: COMMERCIAL

## 2024-07-15 VITALS
RESPIRATION RATE: 16 BRPM | HEIGHT: 59 IN | HEART RATE: 92 BPM | DIASTOLIC BLOOD PRESSURE: 76 MMHG | WEIGHT: 100.53 LBS | SYSTOLIC BLOOD PRESSURE: 121 MMHG | BODY MASS INDEX: 20.27 KG/M2 | TEMPERATURE: 98.3 F | OXYGEN SATURATION: 100 %

## 2024-07-15 DIAGNOSIS — K29.00 ACUTE GASTRITIS, PRESENCE OF BLEEDING UNSPECIFIED, UNSPECIFIED GASTRITIS TYPE: Primary | ICD-10-CM

## 2024-07-15 DIAGNOSIS — K21.9 GASTROESOPHAGEAL REFLUX DISEASE WITHOUT ESOPHAGITIS: ICD-10-CM

## 2024-07-15 LAB
BILIRUB UR QL STRIP.AUTO: NEGATIVE
CLARITY UR: CLEAR
COLOR UR: YELLOW
GLUCOSE UR STRIP.AUTO-MCNC: NEGATIVE MG/DL
HCG UR QL: NEGATIVE
HGB UR QL STRIP.AUTO: NEGATIVE
KETONES UR STRIP.AUTO-MCNC: ABNORMAL MG/DL
LEUKOCYTE ESTERASE UR QL STRIP.AUTO: NEGATIVE
NITRITE UR QL STRIP.AUTO: NEGATIVE
PH UR STRIP.AUTO: 6 [PH] (ref 5–8)
PROT UR STRIP.AUTO-MCNC: NEGATIVE MG/DL
SP GR UR STRIP.AUTO: >=1.03 (ref 1–1.03)
UA COMPLETE W REFLEX CULTURE PNL UR: ABNORMAL
UA DIPSTICK W REFLEX MICRO PNL UR: ABNORMAL
URN SPEC COLLECT METH UR: ABNORMAL
UROBILINOGEN UR STRIP-ACNC: 1 E.U./DL

## 2024-07-15 PROCEDURE — 84703 CHORIONIC GONADOTROPIN ASSAY: CPT

## 2024-07-15 PROCEDURE — 81003 URINALYSIS AUTO W/O SCOPE: CPT

## 2024-07-15 PROCEDURE — 99283 EMERGENCY DEPT VISIT LOW MDM: CPT

## 2024-07-15 RX ORDER — PANTOPRAZOLE SODIUM 40 MG/1
40 TABLET, DELAYED RELEASE ORAL DAILY
Qty: 30 TABLET | Refills: 0 | Status: SHIPPED | OUTPATIENT
Start: 2024-07-15 | End: 2024-08-14

## 2024-07-16 NOTE — DISCHARGE INSTRUCTIONS
Drink plenty of fluids.  Follow-up with a primary care physician in 1 to 2 days for reexamination.  Follow a clear liquid diet for 24 hours.  Advance to a bland diet as tolerated.  If condition worsens or new symptoms develop, return immediately to the emergency department.     Avoid spicy food, alcohol, or medications such as aspirin, Advil, Motrin, Aleve or ibuprofen.    May take Tums or Mylanta as directed for immediate relief of symptoms when it occurs.

## 2024-07-16 NOTE — ED PROVIDER NOTES
Neurological: Negative for headache.    Genitourinary: Negative for flank pain.  Negative for dysuria.  Negative for hematuria.           All systems are reviewed and are negative except for those listed above in the history of present illness and ROS.        PAST MEDICAL HISTORY     Past Medical History:   Diagnosis Date    Mood disorder (HCC)          SURGICAL HISTORY       Past Surgical History:   Procedure Laterality Date    APPENDECTOMY           CURRENT MEDICATIONS       Previous Medications    IBUPROFEN (ADVIL;MOTRIN) 600 MG TABLET    Take 1 tablet by mouth 4 times daily as needed for Pain    IBUPROFEN (ADVIL;MOTRIN) 800 MG TABLET    Take 1 tablet by mouth every 8 hours as needed for Pain or Fever    MELOXICAM (MOBIC) 7.5 MG TABLET    Take 1-2 tablets by mouth daily for 10 days    TIZANIDINE (ZANAFLEX) 2 MG TABLET    Take 1 tablet by mouth nightly as needed (muscle spasm)    VITAMIN D3 (CHOLECALCIFEROL) 25 MCG (1000 UT) TABS TABLET    Take 1 tablet by mouth daily       ALLERGIES     Patient has no known allergies.    FAMILY HISTORY       Family History   Problem Relation Age of Onset    Diabetes Paternal Grandmother           SOCIAL HISTORY       Social History     Socioeconomic History    Marital status: Single   Tobacco Use    Smoking status: Never    Smokeless tobacco: Never   Vaping Use    Vaping Use: Never used   Substance and Sexual Activity    Alcohol use: Never    Drug use: Never    Sexual activity: Yes     Partners: Male     Social Determinants of Health     Financial Resource Strain: Low Risk  (2/14/2024)    Overall Financial Resource Strain (CARDIA)     Difficulty of Paying Living Expenses: Not hard at all   Food Insecurity: No Food Insecurity (2/14/2024)    Hunger Vital Sign     Worried About Running Out of Food in the Last Year: Never true     Ran Out of Food in the Last Year: Never true   Transportation Needs: Unknown (2/14/2024)    PRAPARE - Transportation     Lack of Transportation

## 2024-07-16 NOTE — ED TRIAGE NOTES
Pt states intermittent burning in esophagus and stomach for past two days. Pt denies pain. Pt denies cough. Pt denies hx of GERD. Pt denies any N/V/C/D. Pt denies any other symptoms at the moment. Pt Aox4 and ambulatory.

## 2024-07-28 ENCOUNTER — HOSPITAL ENCOUNTER (EMERGENCY)
Age: 24
Discharge: HOME | End: 2024-07-28
Payer: COMMERCIAL

## 2024-07-28 VITALS
HEIGHT: 59 IN | SYSTOLIC BLOOD PRESSURE: 103 MMHG | BODY MASS INDEX: 20.93 KG/M2 | DIASTOLIC BLOOD PRESSURE: 64 MMHG | HEART RATE: 81 BPM | RESPIRATION RATE: 15 BRPM | OXYGEN SATURATION: 100 % | TEMPERATURE: 98.2 F | WEIGHT: 103.84 LBS

## 2024-07-28 DIAGNOSIS — L23.7 POISON IVY: Primary | ICD-10-CM

## 2024-07-28 DIAGNOSIS — R51.9 NONINTRACTABLE HEADACHE, UNSPECIFIED CHRONICITY PATTERN, UNSPECIFIED HEADACHE TYPE: ICD-10-CM

## 2024-07-28 PROCEDURE — 99283 EMERGENCY DEPT VISIT LOW MDM: CPT

## 2024-07-28 RX ORDER — CALAMINE
LOTION (ML) TOPICAL
Qty: 180 ML | Refills: 0 | Status: SHIPPED | OUTPATIENT
Start: 2024-07-28

## 2024-07-28 RX ORDER — PREDNISONE 20 MG/1
TABLET ORAL
Qty: 18 TABLET | Refills: 0 | Status: SHIPPED | OUTPATIENT
Start: 2024-07-28 | End: 2024-08-07

## 2024-07-28 ASSESSMENT — PAIN SCALES - GENERAL: PAINLEVEL_OUTOF10: 4

## 2024-07-28 NOTE — ED PROVIDER NOTES
**ADVANCED PRACTICE PROVIDER, I HAVE EVALUATED THIS PATIENT**        [facility]  EMERGENCY DEPARTMENT ENCOUNTER      Pt Name: Lali Ta  MRN:[id number]  Birthdate 2000  Date of evaluation: 7/28/2024  Provider: Wilfredo Hankins PA-C  Note Started: 6:54 PM EDT 7/28/24        Chief Complaint:    Chief Complaint   Patient presents with    Rash    Headache         Nursing Notes, Past Medical Hx, Past Surgical Hx, Social Hx, Allergies, and Family Hx were all reviewed and agreed with or any disagreements were addressed in the HPI.    HPI: (Location, Duration, Timing, Severity, Quality, Assoc Sx, Context, Modifying factors)    History From: Patient  Limitations to history : None    Social Determinants Significantly Affecting Health : None    Chief Complaint of rash.  Today she started having headache.  She states on Thursday she was fishing.  She was in some shrubs.  And the next day she broke out in a rash on her legs and arms and on her forehead.  Plaint of being very itchy.  She denies fever, no cough, no nausea vomiting, no extremity pain or weakness.  Woke up today with headache.  She took Motrin with no relief.  She denies neck pain or neck stiffness.  No fever associated with this.  No visual changes.  No extremity pain or weakness.  No other complaints.  She has been taking Benadryl for the rash.  It does help with the itching a little bit but makes her very sleepy.    This is a  24 y.o. female who presents to the emergency room with the above complaint    PastMedical/Surgical History:      Diagnosis Date    Mood disorder (HCC)          Procedure Laterality Date    APPENDECTOMY         Medications:  Discharge Medication List as of 7/28/2024  7:04 PM          Review of Systems:  (1 systems needed)  Review of Systems   Constitutional:  Negative for chills and fever.   HENT:  Negative for congestion and sore throat.    Eyes:  Negative for pain and visual disturbance.

## 2024-07-28 NOTE — DISCHARGE INSTRUCTIONS
Use prescribed medication as prescribed  Caladryl lotion you can apply once a day  Follow-up primary care provider as needed  Return emergency room if worse  Take OTC Motrin or Tylenol for headache.  For the Motrin over-the-counter take 3 tablets every 8 hours as needed.

## 2024-07-28 NOTE — ED NOTES
HA at 4.  Discharge instructions with pt.  Explained rx's and diagnosis.   Encouraged follow up with PCP and to return to ED as needed.
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
## 2024-09-29 ENCOUNTER — APPOINTMENT (OUTPATIENT)
Dept: CT IMAGING | Age: 24
End: 2024-09-29
Payer: COMMERCIAL

## 2024-09-29 ENCOUNTER — HOSPITAL ENCOUNTER (EMERGENCY)
Age: 24
Discharge: HOME | End: 2024-09-29
Payer: COMMERCIAL

## 2024-09-29 VITALS
DIASTOLIC BLOOD PRESSURE: 75 MMHG | WEIGHT: 101.85 LBS | RESPIRATION RATE: 16 BRPM | SYSTOLIC BLOOD PRESSURE: 116 MMHG | BODY MASS INDEX: 20.53 KG/M2 | OXYGEN SATURATION: 99 % | HEART RATE: 78 BPM | TEMPERATURE: 98.5 F | HEIGHT: 59 IN

## 2024-09-29 DIAGNOSIS — S20.211A CONTUSION OF RIGHT CHEST WALL, INITIAL ENCOUNTER: ICD-10-CM

## 2024-09-29 DIAGNOSIS — S30.1XXA CONTUSION OF ABDOMINAL WALL, INITIAL ENCOUNTER: ICD-10-CM
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
## 2024-09-29 DIAGNOSIS — N28.9 KIDNEY LESION: ICD-10-CM

## 2024-09-29 LAB
ALBUMIN SERPL-MCNC: 4.6 G/DL (ref 3.4–5)
ALBUMIN/GLOB SERPL: 1.5 {RATIO} (ref 1.1–2.2)
ALP SERPL-CCNC: 112 U/L (ref 40–129)
ALT SERPL-CCNC: <5 U/L (ref 10–40)
ANION GAP SERPL CALCULATED.3IONS-SCNC: 11 MMOL/L (ref 3–16)
AST SERPL-CCNC: 14 U/L (ref 15–37)
BASOPHILS # BLD: 0.1 K/UL (ref 0–0.2)
BASOPHILS NFR BLD: 1 %
BILIRUB SERPL-MCNC: 0.5 MG/DL (ref 0–1)
BUN SERPL-MCNC: 16 MG/DL (ref 7–20)
CALCIUM SERPL-MCNC: 9.6 MG/DL (ref 8.3–10.6)
CHLORIDE SERPL-SCNC: 104 MMOL/L (ref 99–110)
CO2 SERPL-SCNC: 22 MMOL/L (ref 21–32)
CREAT SERPL-MCNC: <0.5 MG/DL (ref 0.6–1.1)
DEPRECATED RDW RBC AUTO: 12.8 % (ref 12.4–15.4)
EOSINOPHIL # BLD: 0.1 K/UL (ref 0–0.6)
EOSINOPHIL NFR BLD: 1.1 %
GFR SERPLBLD CREATININE-BSD FMLA CKD-EPI: >90 ML/MIN/{1.73_M2}
GLUCOSE SERPL-MCNC: 92 MG/DL (ref 70–99)
HCG SERPL QL: NEGATIVE
HCT VFR BLD AUTO: 36.5 % (ref 36–48)
HGB BLD-MCNC: 12.6 G/DL (ref 12–16)
LYMPHOCYTES # BLD: 2 K/UL (ref 1–5.1)
LYMPHOCYTES NFR BLD: 25.9 %
MCH RBC QN AUTO: 31.4 PG (ref 26–34)
MCHC RBC AUTO-ENTMCNC: 34.5 G/DL (ref 31–36)
MCV RBC AUTO: 91.2 FL (ref 80–100)
MONOCYTES # BLD: 0.8 K/UL (ref 0–1.3)
MONOCYTES NFR BLD: 10.5 %
NEUTROPHILS # BLD: 4.7 K/UL (ref 1.7–7.7)
NEUTROPHILS NFR BLD: 61.5 %
PLATELET # BLD AUTO: 261 K/UL (ref 135–450)
PMV BLD AUTO: 8.9 FL (ref 5–10.5)
POTASSIUM SERPL-SCNC: 3.6 MMOL/L (ref 3.5–5.1)
PROT SERPL-MCNC: 7.6 G/DL (ref 6.4–8.2)
RBC # BLD AUTO: 4.01 M/UL (ref 4–5.2)
SODIUM SERPL-SCNC: 137 MMOL/L (ref 136–145)
WBC # BLD AUTO: 7.7 K/UL (ref 4–11)

## 2024-09-29 PROCEDURE — 80053 COMPREHEN METABOLIC PANEL: CPT

## 2024-09-29 PROCEDURE — 6360000004 HC RX CONTRAST MEDICATION: Performed by: PHYSICIAN ASSISTANT

## 2024-09-29 PROCEDURE — 36415 COLL VENOUS BLD VENIPUNCTURE: CPT

## 2024-09-29 PROCEDURE — 6370000000 HC RX 637 (ALT 250 FOR IP): Performed by: PHYSICIAN ASSISTANT

## 2024-09-29 PROCEDURE — 85025 COMPLETE CBC W/AUTO DIFF WBC: CPT

## 2024-09-29 PROCEDURE — 99285 EMERGENCY DEPT VISIT HI MDM: CPT

## 2024-09-29 PROCEDURE — 70450 CT HEAD/BRAIN W/O DYE: CPT

## 2024-09-29 PROCEDURE — 71260 CT THORAX DX C+: CPT

## 2024-09-29 PROCEDURE — 72125 CT NECK SPINE W/O DYE: CPT

## 2024-09-29 PROCEDURE — 84703 CHORIONIC GONADOTROPIN ASSAY: CPT

## 2024-09-29 RX ORDER — METHOCARBAMOL 500 MG/1
500 TABLET, FILM COATED ORAL 3 TIMES DAILY
Qty: 6 TABLET | Refills: 0 | Status: SHIPPED | OUTPATIENT
Start: 2024-09-29 | End: 2024-10-01

## 2024-09-29 RX ORDER — OXYCODONE HYDROCHLORIDE 5 MG/1
5 TABLET ORAL ONCE
Status: COMPLETED | OUTPATIENT
Start: 2024-09-29 | End: 2024-09-29

## 2024-09-29 RX ADMIN — IOMEPROL INJECTION 100 ML: 714 INJECTION, SOLUTION INTRAVASCULAR at 16:59

## 2024-09-29 RX ADMIN — OXYCODONE HYDROCHLORIDE 5 MG: 5 TABLET ORAL at 15:49

## 2024-09-29 ASSESSMENT — PAIN DESCRIPTION - FREQUENCY: FREQUENCY: CONTINUOUS

## 2024-09-29 ASSESSMENT — PAIN - FUNCTIONAL ASSESSMENT
PAIN_FUNCTIONAL_ASSESSMENT: ACTIVITIES ARE NOT PREVENTED
PAIN_FUNCTIONAL_ASSESSMENT: PREVENTS OR INTERFERES SOME ACTIVE ACTIVITIES AND ADLS

## 2024-09-29 ASSESSMENT — PAIN SCALES - GENERAL
PAINLEVEL_OUTOF10: 3
PAINLEVEL_OUTOF10: 7
PAINLEVEL_OUTOF10: 1
PAINLEVEL_OUTOF10: 5

## 2024-09-29 ASSESSMENT — PAIN DESCRIPTION - DESCRIPTORS: DESCRIPTORS: ACHING

## 2024-09-29 ASSESSMENT — PAIN DESCRIPTION - PAIN TYPE: TYPE: ACUTE PAIN

## 2024-09-29 ASSESSMENT — PAIN DESCRIPTION - LOCATION: LOCATION: ARM;HAND;WRIST

## 2024-09-29 ASSESSMENT — PAIN DESCRIPTION - ONSET: ONSET: ON-GOING

## 2024-09-29 ASSESSMENT — PAIN DESCRIPTION - ORIENTATION: ORIENTATION: RIGHT
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
## 2024-09-29 NOTE — ED NOTES
Discharge and education instructions reviewed. Patient verbalized understanding, teach-back successful. Patient denied questions at this time. No acute distress noted. Patient instructed to follow-up as noted - return to emergency department if symptoms worsen. Patient verbalized understanding. Discharged per ED provider with discharge instructions.

## 2024-11-29 ENCOUNTER — HOSPITAL ENCOUNTER (EMERGENCY)
Age: 24
Discharge: HOME | End: 2024-11-29
Attending: EMERGENCY MEDICINE
Payer: COMMERCIAL

## 2024-11-29 VITALS
HEART RATE: 91 BPM | HEIGHT: 59 IN | SYSTOLIC BLOOD PRESSURE: 118 MMHG | BODY MASS INDEX: 21.29 KG/M2 | TEMPERATURE: 98.2 F | OXYGEN SATURATION: 100 % | WEIGHT: 105.6 LBS | RESPIRATION RATE: 12 BRPM | DIASTOLIC BLOOD PRESSURE: 67 MMHG

## 2024-11-29 DIAGNOSIS — Z3A.01 LESS THAN 8 WEEKS GESTATION OF PREGNANCY: ICD-10-CM

## 2024-11-29 DIAGNOSIS — K59.00 CONSTIPATION, UNSPECIFIED CONSTIPATION TYPE: Primary | ICD-10-CM

## 2024-11-29 DIAGNOSIS — R52 GENERALIZED BODY ACHES: ICD-10-CM

## 2024-11-29 LAB
BILIRUB UR QL STRIP.AUTO: NEGATIVE
CLARITY UR: CLEAR
COLOR UR: YELLOW
FLUAV RNA UPPER RESP QL NAA+PROBE: NEGATIVE
FLUBV AG NPH QL: NEGATIVE
GLUCOSE UR STRIP.AUTO-MCNC: NEGATIVE MG/DL
HCG UR QL: POSITIVE
HGB UR QL STRIP.AUTO: NEGATIVE
KETONES UR STRIP.AUTO-MCNC: NEGATIVE MG/DL
LEUKOCYTE ESTERASE UR QL STRIP.AUTO: NEGATIVE
NITRITE UR QL STRIP.AUTO: NEGATIVE
PH UR STRIP.AUTO: 6 [PH] (ref 5–8)
PROT UR STRIP.AUTO-MCNC: NEGATIVE MG/DL
SARS-COV-2 RDRP RESP QL NAA+PROBE: NOT DETECTED
SP GR UR STRIP.AUTO: >=1.03 (ref 1–1.03)
UA COMPLETE W REFLEX CULTURE PNL UR: NORMAL
UA DIPSTICK W REFLEX MICRO PNL UR: NORMAL
URN SPEC COLLECT METH UR: NORMAL
UROBILINOGEN UR STRIP-ACNC: 0.2 E.U./DL

## 2024-11-29 PROCEDURE — 87635 SARS-COV-2 COVID-19 AMP PRB: CPT

## 2024-11-29 PROCEDURE — 99283 EMERGENCY DEPT VISIT LOW MDM: CPT

## 2024-11-29 PROCEDURE — 81003 URINALYSIS AUTO W/O SCOPE: CPT

## 2024-11-29 PROCEDURE — 6370000000 HC RX 637 (ALT 250 FOR IP): Performed by: EMERGENCY MEDICINE

## 2024-11-29 PROCEDURE — 84703 CHORIONIC GONADOTROPIN ASSAY: CPT

## 2024-11-29 PROCEDURE — 87804 INFLUENZA ASSAY W/OPTIC: CPT

## 2024-11-29 RX ORDER — DOCUSATE SODIUM 100 MG/1
100 CAPSULE, LIQUID FILLED ORAL 2 TIMES DAILY PRN
Qty: 30 CAPSULE | Refills: 0 | Status: SHIPPED | OUTPATIENT
Start: 2024-11-29

## 2024-11-29 RX ORDER — ACETAMINOPHEN 325 MG/1
650 TABLET ORAL ONCE
Status: COMPLETED | OUTPATIENT
Start: 2024-11-29 | End: 2024-11-29

## 2024-11-29 RX ADMIN — ACETAMINOPHEN 650 MG: 325 TABLET ORAL at 20:10

## 2024-11-30 NOTE — ED NOTES
Discharge and education instructions reviewed. Patient verbalized understanding, teach-back successful. Patient denied questions at this time. No acute distress noted. Patient instructed to follow-up as noted - return to emergency department if symptoms worsen. Patient verbalized understanding. Discharged per EDMD with discharge instructions. Pt AOx4 and ambulatory.

## 2024-11-30 NOTE — ED PROVIDER NOTES
EMERGENCY DEPARTMENT ENCOUNTER     [facility]     Pt Name: Lali Ta   MRN: [id number]   Birthdate 2000   Date of evaluation: 11/29/2024   Provider: Bruce Barrientos MD   PCP: Radha Lynne MD   Note Started: 7:03 PM EST 11/29/24     CHIEF COMPLAINT     Chief Complaint   Patient presents with    Constipation     Patient complains she hasn't had a bowel movement since yesterday morning. Patient denies belly pain.     Generalized Body Aches     Patient complains of generalized body aches and fatigue since yesterday. Patient states that he is nauseated no vomiting. Patient 7 weeks pregnant.         HISTORY OF PRESENT ILLNESS:  History from : Patient   Limitations to history : None     Lali Ta is a 24 y.o. female who  has a past medical history of Mood disorder (HCC). presents complaining of constipation stating she has not had a bowel movement since yesterday.  Patient states she is passing gas.  She denies any associate abdominal pain.  Denies any nausea or vomiting.  States she is having normal urinary habits.  States she is 7 weeks pregnant.  Denies any vaginal bleeding or vaginal discharge.  Denies any fevers.  No chest pain, shortness of breath or cough.  States she has had some generalized bodyaches which she notices mainly in her thighs and occasionally in her flanks.  Patient denies any neck pain or back pain.  States she is currently on prenatal vitamins for her pregnancy.    Nursing Notes were all reviewed and agreed with or any disagreements were addressed in the HPI.     ROS: Positives and Pertinent negatives as per HPI.    PAST MEDICAL HISTORY     Past medical history:  has a past medical history of Mood disorder (HCC).    Past surgical history:  has a past surgical history that includes Appendectomy.      PHYSICAL EXAM:  ED Triage Vitals [11/29/24 1841]   BP Systolic BP Percentile Diastolic BP Percentile Temp Temp Source Pulse Respirations SpO2    118/67 -- -- 98.2 °F (36.8 °C) Oral 91 12 100 %      Height Weight - Scale         1.499 m (4' 11\") 47.9 kg (105 lb 9.6 oz)                 CONSTITUTIONAL: AOx4, NAD, cooperative with exam, afebrile   HEAD: normocephalic, atraumatic   EYES: PERRL, EOMI, anicteric sclera   ENT: Moist mucous membranes, uvula midline   NECK: Supple, symmetric, trachea midline   BACK: Symmetric, no deformity, no CVA tenderness   LUNGS: Bilateral breath sounds, CTAB, no rales/ronchi/wheezes   CARDIOVASCULAR: RRR, normal S1/S2, no m/r/g, 2+ pulses throughout   ABDOMEN: Soft, non-tender, non-distended, +BS   NEUROLOGIC:  MAEx4, 5/5 strength throughout; fine touch sensation intact throughout; normal gait; GCS 15, cranial nerves II through XII intact   MUSCULOSKELETAL: No clubbing, cyanosis or edema, compartments soft all 4 extremity, DP pulse and radial pulse 2+ bilateral   SKIN: No rash, pallor or wounds on exposed surfaces        DIAGNOSTIC RESULTS   LABS:   Labs Reviewed   COVID-19, RAPID   RAPID INFLUENZA A/B ANTIGENS   URINALYSIS WITH REFLEX TO CULTURE   PREGNANCY, URINE      When ordered only abnormal lab results are displayed. All other labs were within normal range or not returned as of this dictation.     The Ekg interpreted by me shows  None    RADIOLOGY:    Non-plain film images such as CT, Ultrasound and MRI are read by the radiologist. Plain radiographic images are visualized and preliminarily interpreted by the ED Provider with the below findings:        Interpretation per the Radiologist below, if available at the time of this note:  No orders to display            EMERGENCY DEPARTMENT COURSE and DIFFERENTIAL DIAGNOSIS/MDM:     Vitals:    Vitals:    11/29/24 1841   BP: 118/67   Pulse: 91   Resp: 12   Temp: 98.2 °F (36.8 °C)   TempSrc: Oral   SpO2: 100%   Weight: 47.9 kg (105 lb 9.6 oz)   Height: 1.499 m (4' 11\")        Patient was given the following medications:   Medications   acetaminophen (TYLENOL) tablet 650 mg (650 mg

## 2024-11-30 NOTE — DISCHARGE INSTRUCTIONS
Thank you for visiting [facility] Emergency Department.    You need to call in morning to make appointment as directed with appropriate doctor.    Should you have any questions regarding your care or further treatment, please call [facility] Emergency Department at [phone].    Take any medications as prescribed.  You can take Tylenol  every 6-8 hours as needed for your achiness.     Return to ED if symptoms worsen, do not improve, fever > 101.5, excessive nausea or vomiting, and unable to follow up with your physician, or any other care or concern.

## 2025-01-11 ENCOUNTER — HOSPITAL ENCOUNTER (EMERGENCY)
Age: 25
Discharge: HOME | End: 2025-01-12
Attending: EMERGENCY MEDICINE
Payer: COMMERCIAL

## 2025-01-11 VITALS
HEART RATE: 74 BPM | SYSTOLIC BLOOD PRESSURE: 93 MMHG | BODY MASS INDEX: 21.42 KG/M2 | TEMPERATURE: 98.2 F | RESPIRATION RATE: 16 BRPM | OXYGEN SATURATION: 100 % | WEIGHT: 106.04 LBS | DIASTOLIC BLOOD PRESSURE: 53 MMHG

## 2025-01-11 DIAGNOSIS — R10.30 LOWER ABDOMINAL PAIN: ICD-10-CM

## 2025-01-11 DIAGNOSIS — N30.00 ACUTE CYSTITIS WITHOUT HEMATURIA: Primary | ICD-10-CM

## 2025-01-11 LAB
BACTERIA GENITAL QL WET PREP: NORMAL
BACTERIA URNS QL MICRO: ABNORMAL /HPF
BILIRUB UR QL STRIP.AUTO: NEGATIVE
CLARITY UR: CLEAR
CLUE CELLS SPEC QL WET PREP: NORMAL
COLOR UR: YELLOW
EPI CELLS #/AREA URNS HPF: ABNORMAL /HPF (ref 0–5)
EPI CELLS SPEC QL WET PREP: NORMAL
GLUCOSE UR STRIP.AUTO-MCNC: NEGATIVE MG/DL
HGB UR QL STRIP.AUTO: NEGATIVE
KETONES UR STRIP.AUTO-MCNC: NEGATIVE MG/DL
LEUKOCYTE ESTERASE UR QL STRIP.AUTO: NEGATIVE
MUCOUS THREADS #/AREA URNS LPF: ABNORMAL /LPF
NITRITE UR QL STRIP.AUTO: POSITIVE
PH UR STRIP.AUTO: 6 [PH] (ref 5–8)
PROT UR STRIP.AUTO-MCNC: NEGATIVE MG/DL
RBC #/AREA URNS HPF: ABNORMAL /HPF (ref 0–4)
RBC SPEC QL WET PREP: NORMAL
SP GR UR STRIP.AUTO: >=1.03 (ref 1–1.03)
SPECIMEN SOURCE FLD: NORMAL
T VAGINALIS GENITAL QL WET PREP: NORMAL
UA COMPLETE W REFLEX CULTURE PNL UR: ABNORMAL
UA DIPSTICK W REFLEX MICRO PNL UR: YES
URN SPEC COLLECT METH UR: ABNORMAL
UROBILINOGEN UR STRIP-ACNC: 1 E.U./DL
WBC #/AREA URNS HPF: ABNORMAL /HPF (ref 0–5)
WBC SPEC QL WET PREP: NORMAL
YEAST GENITAL QL WET PREP: NORMAL

## 2025-01-11 PROCEDURE — 87491 CHLMYD TRACH DNA AMP PROBE: CPT

## 2025-01-11 PROCEDURE — 87591 N.GONORRHOEAE DNA AMP PROB: CPT

## 2025-01-11 PROCEDURE — 87210 SMEAR WET MOUNT SALINE/INK: CPT

## 2025-01-11 PROCEDURE — 81001 URINALYSIS AUTO W/SCOPE: CPT

## 2025-01-11 PROCEDURE — 99283 EMERGENCY DEPT VISIT LOW MDM: CPT

## 2025-01-11 ASSESSMENT — PAIN DESCRIPTION - DESCRIPTORS: DESCRIPTORS: CRAMPING

## 2025-01-11 ASSESSMENT — PAIN DESCRIPTION - LOCATION: LOCATION: ABDOMEN

## 2025-01-11 ASSESSMENT — PAIN SCALES - GENERAL: PAINLEVEL_OUTOF10: 4

## 2025-01-11 ASSESSMENT — PAIN DESCRIPTION - ORIENTATION: ORIENTATION: LOWER

## 2025-01-12 PROCEDURE — 6370000000 HC RX 637 (ALT 250 FOR IP): Performed by: EMERGENCY MEDICINE

## 2025-01-12 RX ORDER — NITROFURANTOIN 25; 75 MG/1; MG/1
100 CAPSULE ORAL ONCE
Status: COMPLETED | OUTPATIENT
Start: 2025-01-12 | End: 2025-01-12

## 2025-01-12 RX ORDER — NITROFURANTOIN 25; 75 MG/1; MG/1
100 CAPSULE ORAL 2 TIMES DAILY
Qty: 20 CAPSULE | Refills: 0 | Status: SHIPPED | OUTPATIENT
Start: 2025-01-12 | End: 2025-01-22

## 2025-01-12 RX ADMIN — NITROFURANTOIN (MONOHYDRATE/MACROCRYSTALS) 100 MG: 25; 75 CAPSULE ORAL at 00:32

## 2025-01-12 NOTE — ED PROVIDER NOTES
[facility] EMERGENCY DEPARTMENT  EMERGENCY DEPARTMENT ENCOUNTER      Pt Name: Lali Ta  MRN: [id number]  Birthdate 2000  Date of evaluation: 2025  Provider: Carrie Luz MD    CHIEF COMPLAINT       Chief Complaint   Patient presents with    Abdominal Pain     Lower abdominal pain, described as cramping, rates 4, denies dysuria, vaginal bleeding or discharge.  Patient is  at 13 weeks gestation, no complications with this pregnancy.            HISTORY OF PRESENT ILLNESS   (Location/Symptom, Timing/Onset, Context/Setting, Quality, Duration, Modifying Factors, Severity)  Note limiting factors.   Lali Ta is a 24 y.o. female who presents to the emergency department with lower abdominal cramping.  No vaginal discharge or dysuria but she states that she is urinating frequently.  No vaginal discharge.  She states that the last ultrasound she had was in October at 5 weeks and there was no visible pregnancy in the uterus.  Patient is G3, P2.      This patient is at risk for a communicable infection. Therefore, personal protection equipment consisting of a mask and gloves worn for the exam.     Nursing Notes were reviewed.    REVIEW OF SYSTEMS    (2-9 systems for level 4, 10 or more for level 5)     As per HPI    Except as noted above the remainder of the review of systems was reviewed and negative.       PAST MEDICAL HISTORY     Past Medical History:   Diagnosis Date    Mood disorder (HCC)          SURGICAL HISTORY       Past Surgical History:   Procedure Laterality Date    APPENDECTOMY           CURRENT MEDICATIONS       Previous Medications    CALAMINE LOTION    Apply topically as needed.    DOCUSATE SODIUM (COLACE) 100 MG CAPSULE    Take 1 capsule by mouth 2 times daily as needed for Constipation       ALLERGIES     Cephalexin and Sulfamethoxazole-trimethoprim    FAMILY HISTORY       Family History   Problem Relation Age of Onset    Diabetes Paternal Grandmother           SOCIAL

## 2025-01-12 NOTE — ED TRIAGE NOTES
Patient to ED for abdominal pain during pregnancy.  Patient is alert and oriented with GCS 15.  Patient reports diffuse abdominal cramping since last PM, rates pain 4, denies any n/v with episode.  Patient is currently pregnant,  at 13 weeks gestation.  Patient denies any dysuria, vaginal bleeding or discharge.  LBM today and was described as \"regular\".  Patient denies any other complaints at this time.

## 2025-01-12 NOTE — ED NOTES
Patient changed into gown and provided with warm blanket for provider exam.  Urine sample also obtained at this time and held at bedside.

## 2025-01-14 LAB
C TRACH DNA CVX QL NAA+PROBE: POSITIVE
N GONORRHOEA DNA CERV MUCUS QL NAA+PROBE: NEGATIVE

## 2025-01-14 NOTE — RESULT ENCOUNTER NOTE
Patient's positive result has been appropriately evaluated by the provider pool.   Patient was contacted and notified of the change in treatment plan.    Medication was phoned to the patient's pharmacy per protocol:    Pharmacy:   [facility] [id number] - [facility] [address] - P [phone] - F [phone]  [address]  [facility] [address]  Phone: [phone] Fax: [phone]    [id number] - [facility] [address] - P [phone] - F [phone]  [address] [place] [id number]  Phone: [phone] Fax: [phone]    Spoke with pt .. [facility]

## 2025-05-31 ENCOUNTER — HOSPITAL ENCOUNTER (EMERGENCY)
Age: 25
Discharge: HOME | End: 2025-06-01
Attending: STUDENT IN AN ORGANIZED HEALTH CARE EDUCATION/TRAINING PROGRAM
Payer: COMMERCIAL

## 2025-05-31 DIAGNOSIS — E83.42 HYPOMAGNESEMIA: ICD-10-CM

## 2025-05-31 DIAGNOSIS — E87.6 HYPOKALEMIA: ICD-10-CM

## 2025-05-31 DIAGNOSIS — L30.9 DERMATITIS: Primary | ICD-10-CM

## 2025-05-31 LAB
ALBUMIN SERPL-MCNC: 3.2 G/DL (ref 3.4–5)
ALBUMIN/GLOB SERPL: 0.9 {RATIO} (ref 1.1–2.2)
ALP SERPL-CCNC: 196 U/L (ref 40–129)
ALT SERPL-CCNC: ABNORMAL U/L (ref 10–40)
ANION GAP SERPL CALCULATED.3IONS-SCNC: 14 MMOL/L (ref 3–16)
AST SERPL-CCNC: 48 U/L (ref 15–37)
BASOPHILS # BLD: 0.1 K/UL (ref 0–0.2)
BASOPHILS NFR BLD: 1.1 %
BILIRUB SERPL-MCNC: 0.3 MG/DL (ref 0–1)
BILIRUB UR QL STRIP.AUTO: NEGATIVE
BUN SERPL-MCNC: 8 MG/DL (ref 7–20)
CALCIUM SERPL-MCNC: 8.8 MG/DL (ref 8.3–10.6)
CHLORIDE SERPL-SCNC: 102 MMOL/L (ref 99–110)
CLARITY UR: CLEAR
CO2 SERPL-SCNC: 17 MMOL/L (ref 21–32)
COLOR UR: YELLOW
CREAT SERPL-MCNC: 0.5 MG/DL (ref 0.6–1.1)
DEPRECATED RDW RBC AUTO: 13 % (ref 12.4–15.4)
EOSINOPHIL # BLD: 0.2 K/UL (ref 0–0.6)
EOSINOPHIL NFR BLD: 1.7 %
GFR SERPLBLD CREATININE-BSD FMLA CKD-EPI: >90 ML/MIN/{1.73_M2}
GLUCOSE SERPL-MCNC: 96 MG/DL (ref 70–99)
GLUCOSE UR STRIP.AUTO-MCNC: NEGATIVE MG/DL
HCT VFR BLD AUTO: 29.7 % (ref 36–48)
HGB BLD-MCNC: 9.8 G/DL (ref 12–16)
HGB UR QL STRIP.AUTO: NEGATIVE
KETONES UR STRIP.AUTO-MCNC: NEGATIVE MG/DL
LEUKOCYTE ESTERASE UR QL STRIP.AUTO: NEGATIVE
LYMPHOCYTES # BLD: 2.5 K/UL (ref 1–5.1)
LYMPHOCYTES NFR BLD: 23 %
MCH RBC QN AUTO: 29.4 PG (ref 26–34)
MCHC RBC AUTO-ENTMCNC: 33.1 G/DL (ref 31–36)
MCV RBC AUTO: 89 FL (ref 80–100)
MONOCYTES # BLD: 1.5 K/UL (ref 0–1.3)
MONOCYTES NFR BLD: 14.3 %
NEUTROPHILS # BLD: 6.4 K/UL (ref 1.7–7.7)
NEUTROPHILS NFR BLD: 59.9 %
NITRITE UR QL STRIP.AUTO: NEGATIVE
PH UR STRIP.AUTO: 6 [PH] (ref 5–8)
PLATELET # BLD AUTO: 246 K/UL (ref 135–450)
PMV BLD AUTO: 10.8 FL (ref 5–10.5)
POTASSIUM SERPL-SCNC: ABNORMAL MMOL/L (ref 3.5–5.1)
PROT SERPL-MCNC: 6.6 G/DL (ref 6.4–8.2)
PROT UR STRIP.AUTO-MCNC: NEGATIVE MG/DL
RBC # BLD AUTO: 3.34 M/UL (ref 4–5.2)
SODIUM SERPL-SCNC: 133 MMOL/L (ref 136–145)
SP GR UR STRIP.AUTO: 1.02 (ref 1–1.03)
UA COMPLETE W REFLEX CULTURE PNL UR: ABNORMAL
UA DIPSTICK W REFLEX MICRO PNL UR: ABNORMAL
URN SPEC COLLECT METH UR: ABNORMAL
UROBILINOGEN UR STRIP-ACNC: 2 E.U./DL
WBC # BLD AUTO: 10.7 K/UL (ref 4–11)

## 2025-05-31 PROCEDURE — 36415 COLL VENOUS BLD VENIPUNCTURE: CPT

## 2025-05-31 PROCEDURE — 81003 URINALYSIS AUTO W/O SCOPE: CPT

## 2025-05-31 PROCEDURE — 99283 EMERGENCY DEPT VISIT LOW MDM: CPT

## 2025-05-31 PROCEDURE — 80053 COMPREHEN METABOLIC PANEL: CPT

## 2025-05-31 PROCEDURE — 85025 COMPLETE CBC W/AUTO DIFF WBC: CPT

## 2025-05-31 ASSESSMENT — PAIN - FUNCTIONAL ASSESSMENT: PAIN_FUNCTIONAL_ASSESSMENT: NONE - DENIES PAIN

## 2025-06-01 VITALS
RESPIRATION RATE: 16 BRPM | TEMPERATURE: 98.1 F | DIASTOLIC BLOOD PRESSURE: 76 MMHG | HEART RATE: 82 BPM | BODY MASS INDEX: 27.2 KG/M2 | OXYGEN SATURATION: 100 % | SYSTOLIC BLOOD PRESSURE: 124 MMHG | WEIGHT: 134.92 LBS | HEIGHT: 59 IN

## 2025-06-01 LAB
ALBUMIN SERPL-MCNC: 3.5 G/DL (ref 3.4–5)
ALBUMIN/GLOB SERPL: 1 {RATIO} (ref 1.1–2.2)
ALP SERPL-CCNC: 215 U/L (ref 40–129)
ALT SERPL-CCNC: 18 U/L (ref 10–40)
ANION GAP SERPL CALCULATED.3IONS-SCNC: 14 MMOL/L (ref 3–16)
AST SERPL-CCNC: 25 U/L (ref 15–37)
BILIRUB SERPL-MCNC: 0.3 MG/DL (ref 0–1)
BUN SERPL-MCNC: 7 MG/DL (ref 7–20)
CALCIUM SERPL-MCNC: 8.9 MG/DL (ref 8.3–10.6)
CHLORIDE SERPL-SCNC: 103 MMOL/L (ref 99–110)
CO2 SERPL-SCNC: 19 MMOL/L (ref 21–32)
CREAT SERPL-MCNC: 0.5 MG/DL (ref 0.6–1.1)
GFR SERPLBLD CREATININE-BSD FMLA CKD-EPI: >90 ML/MIN/{1.73_M2}
GLUCOSE SERPL-MCNC: 91 MG/DL (ref 70–99)
MAGNESIUM SERPL-MCNC: 1.76 MG/DL (ref 1.8–2.4)
POTASSIUM SERPL-SCNC: 3.2 MMOL/L (ref 3.5–5.1)
PROT SERPL-MCNC: 6.9 G/DL (ref 6.4–8.2)
SODIUM SERPL-SCNC: 136 MMOL/L (ref 136–145)

## 2025-06-01 PROCEDURE — 80053 COMPREHEN METABOLIC PANEL: CPT

## 2025-06-01 PROCEDURE — 83735 ASSAY OF MAGNESIUM: CPT

## 2025-06-01 PROCEDURE — 36415 COLL VENOUS BLD VENIPUNCTURE: CPT

## 2025-06-01 PROCEDURE — 6370000000 HC RX 637 (ALT 250 FOR IP): Performed by: STUDENT IN AN ORGANIZED HEALTH CARE EDUCATION/TRAINING PROGRAM

## 2025-06-01 RX ORDER — POTASSIUM CHLORIDE 750 MG/1
10 TABLET, EXTENDED RELEASE ORAL DAILY
Qty: 5 TABLET | Refills: 0 | Status: SHIPPED | OUTPATIENT
Start: 2025-06-01 | End: 2025-06-06

## 2025-06-01 RX ORDER — POTASSIUM CHLORIDE 750 MG/1
40 TABLET, EXTENDED RELEASE ORAL ONCE
Status: COMPLETED | OUTPATIENT
Start: 2025-06-01 | End: 2025-06-01

## 2025-06-01 RX ORDER — MAGNESIUM OXIDE 400 MG/1
400 TABLET ORAL DAILY
Qty: 5 TABLET | Refills: 1 | Status: SHIPPED | OUTPATIENT
Start: 2025-06-01

## 2025-06-01 RX ORDER — MAGNESIUM SULFATE 1 G/100ML
1000 INJECTION INTRAVENOUS ONCE
Status: DISCONTINUED | OUTPATIENT
Start: 2025-06-01 | End: 2025-06-01

## 2025-06-01 RX ADMIN — POTASSIUM CHLORIDE 40 MEQ: 750 TABLET, FILM COATED, EXTENDED RELEASE ORAL at 02:35

## 2025-06-01 NOTE — ED PROVIDER NOTES
[facility] EMERGENCY DEPARTMENT     EMERGENCY DEPARTMENT ENCOUNTER            Pt Name: Lali Ta   MRN: [id number]   Birthdate 2000   Date of evaluation: 5/31/2025   Provider: Rj Sinha MD   PCP: Radha Lynne MD   Note Started: 11:41 PM EDT 5/31/25          CHIEF COMPLAINT     Chief Complaint   Patient presents with    Rash     Pt states that she was seen by PCP and prescribed cream for the rash on her BLE. Pt states that the cream isn't working and that is why she came into the ER. Pt states that rash started Tuesday. Pt is 33 weeks gestation. Pt denies Chest PAIN, SOB, N/V/D, vision changes, headache, or any other concerns at this time.              HISTORY OF PRESENT ILLNESS:   History from : Patient   Limitations to history : None     Lali Ta is a 24 y.o. female who presents with a rash of her bilateral lower extremities.  She is 33 weeks pregnant and has already been treated with triamcinolone ointment.  She states that she talk to her OB 2 and they are not concerned because it is her lower extremities only and is not above her knees or towards her abdomen.  She does state that it is pruritic.  She states that anti-itch cream is helping but she cannot get the rash to actually go away.  She is only tried the triamcinolone cream.  Denies any fever, chills, nausea, vomiting.  No abdominal pain or cramping or vaginal bleeding.  Otherwise is at her baseline.  She already has a follow-up scheduled with her OB this upcoming Wednesday.  There is no other rash anywhere on her legs, arms, back, chest.  She has not had this before.    Nursing Notes were all reviewed and agreed with, or any disagreements were addressed in the HPI.     REVIEW OF SYSTEMS :    Positives and Pertinent negatives as per HPI.      MEDICAL HISTORY   has a past medical history of Mood disorder.    Past Surgical History:   Procedure Laterality Date    APPENDECTOMY        CURRENTMEDICATIONS       Discharge

## 2025-06-01 NOTE — ED NOTES

## 2025-06-01 NOTE — ED TRIAGE NOTES
Pt states that she was seen by PCP and prescribed cream for the rash on her BLE. Pt states that the cream isn't working and that is why she came into the ER. Pt states that rash started Tuesday. Pt is 33 weeks gestation. Pt denies Chest PAIN, SOB, N/V/D, vision changes, headache, or any other concerns at this time.

## 2025-06-01 NOTE — DISCHARGE INSTRUCTIONS
Please follow-up with your primary care.  If you have any worsening symptoms please return to the emergency department for reevaluation.  Take medications as instructed.  Call your OB on Monday and let them know that you were evaluated and that she had to have a new appointment.  Ensure that they do not want to see you any sooner.  If your rash ever spreads above your knees or ever involves your abdomen or any other place on your body please return immediately to the emergency room either here or [facility].  Also let your OB/GYN know.